# Patient Record
Sex: FEMALE | Race: WHITE | NOT HISPANIC OR LATINO | URBAN - METROPOLITAN AREA
[De-identification: names, ages, dates, MRNs, and addresses within clinical notes are randomized per-mention and may not be internally consistent; named-entity substitution may affect disease eponyms.]

---

## 2019-01-09 ENCOUNTER — TRANSFERRED RECORDS (OUTPATIENT)
Dept: HEALTH INFORMATION MANAGEMENT | Facility: CLINIC | Age: 59
End: 2019-01-09

## 2019-01-17 ENCOUNTER — TRANSFERRED RECORDS (OUTPATIENT)
Dept: HEALTH INFORMATION MANAGEMENT | Facility: CLINIC | Age: 59
End: 2019-01-17

## 2019-02-05 ENCOUNTER — TRANSFERRED RECORDS (OUTPATIENT)
Dept: HEALTH INFORMATION MANAGEMENT | Facility: CLINIC | Age: 59
End: 2019-02-05

## 2019-04-01 ENCOUNTER — TRANSFERRED RECORDS (OUTPATIENT)
Dept: HEALTH INFORMATION MANAGEMENT | Facility: CLINIC | Age: 59
End: 2019-04-01

## 2019-05-22 ENCOUNTER — TRANSFERRED RECORDS (OUTPATIENT)
Dept: HEALTH INFORMATION MANAGEMENT | Facility: CLINIC | Age: 59
End: 2019-05-22

## 2019-06-24 ENCOUNTER — TRANSFERRED RECORDS (OUTPATIENT)
Dept: HEALTH INFORMATION MANAGEMENT | Facility: CLINIC | Age: 59
End: 2019-06-24

## 2019-09-12 ENCOUNTER — TRANSFERRED RECORDS (OUTPATIENT)
Dept: HEALTH INFORMATION MANAGEMENT | Facility: CLINIC | Age: 59
End: 2019-09-12

## 2019-09-19 ENCOUNTER — TRANSFERRED RECORDS (OUTPATIENT)
Dept: HEALTH INFORMATION MANAGEMENT | Facility: CLINIC | Age: 59
End: 2019-09-19

## 2019-09-19 ENCOUNTER — MEDICAL CORRESPONDENCE (OUTPATIENT)
Dept: HEALTH INFORMATION MANAGEMENT | Facility: CLINIC | Age: 59
End: 2019-09-19

## 2019-09-24 NOTE — TELEPHONE ENCOUNTER
FUTURE VISIT INFORMATION      FUTURE VISIT INFORMATION:    Date: 11/25/2019    Time: 8:20 AM    Location: [facility]   REFERRAL INFORMATION:    Referring provider:  Dr. Noble Ferreira     Referring providers clinic:  [facility]    Reason for visit/diagnosis  Voice hoarseness, Esophageal Dysphagia    RECORDS REQUESTED FROM:       Clinic name Comments Records Status Imaging Status   [facility] 9/19/19 Office notes with Dr. Ferreira, Referral     9/26/19 EGD *received 10/4/19 Received- sent to scan 9/24/19    *9/26/19 EGD sent to scan 10/4/19    [facility] XR Video Swallow: 6/24/19  XR Esophagram: 6/24/19  US Soft Tissue Head or Neck: 4/1/19    Records received 10/31 - sent to scan  7/5/18 ENT notes with Kiara Ennis NP  8/1/19, 10/3/19 notes with Dr Agus Shoemaker   6/24/19 SLP notes with Janny Rose SLP   Received- sent to scan 10/4/19  PACS- archived 10/4/19                                9/24/19 Per scheduling staff, referra and recs to be faxed into clinic. Will follow up by the end of the week to see if they have been received. -Bhao    9/23/19 4:18PM received referral and office notes from [facility], Dr. Ferreira. Sent to scan on 9/24/19 12:30PM. Per office notes with Dr. Ferreira, it was noted that Pt saw an ENT specialist in [place].Called Pt and Pt noted that she was seen at [facility] as well. Pt noted that she has had an Upper GI Endoscopy with [facility] and is scheduled for another one with [facility] on 9/26/19. -Bhao    9/24/19 12:42PM sent fax request to [facility] ([phone]) for medical records -Bhao    9/27/19 1:52pm Sent 2nd fax request to [facility] for med recs. Sent request to [facility] ([phone]) for 9/26/19 EGD. -Bhao    10/4/19 9:41am Sent request to [facility] for images notes above; sent request to [facility] for EGD 9/26/19. -Bhao     10/4/19 11:02am Received EGD 9/26/19 report from [facility]; sent to scan for uploading. -ao     10/31/19 9:10AM received [facility] recs and sent to  neal. Sent a fax request or any speech records to Chris Thomason   *received and sent to neal Thomason     * 11/25/19 8:07 AM Called out to [facility] to get most recent office visit and study information - Milla

## 2019-09-26 ENCOUNTER — TRANSFERRED RECORDS (OUTPATIENT)
Dept: HEALTH INFORMATION MANAGEMENT | Facility: CLINIC | Age: 59
End: 2019-09-26

## 2019-10-03 ENCOUNTER — TRANSFERRED RECORDS (OUTPATIENT)
Dept: HEALTH INFORMATION MANAGEMENT | Facility: CLINIC | Age: 59
End: 2019-10-03

## 2019-10-03 ENCOUNTER — DOCUMENTATION ONLY (OUTPATIENT)
Dept: CARE COORDINATION | Facility: CLINIC | Age: 59
End: 2019-10-03

## 2019-10-16 ENCOUNTER — TRANSFERRED RECORDS (OUTPATIENT)
Dept: HEALTH INFORMATION MANAGEMENT | Facility: CLINIC | Age: 59
End: 2019-10-16

## 2019-10-30 ENCOUNTER — DOCUMENTATION ONLY (OUTPATIENT)
Dept: OTOLARYNGOLOGY | Facility: CLINIC | Age: 59
End: 2019-10-30

## 2019-10-30 NOTE — PROGRESS NOTES
Received a vm message from gianni at [facility], wanting clarification on records needed, as pts 2 yr history would produce about 454 pgs of records. Attempted to call gianni back and got voicemail, so this writer left a vm message advising them to send us any ENT related information, in specific anything related to the diagnosis patient is being referred here for with is hoarseness and dysphagia and any testing done relating to this. Call back number was left on vm in case there are any further questions.

## 2019-11-04 ENCOUNTER — TRANSFERRED RECORDS (OUTPATIENT)
Dept: HEALTH INFORMATION MANAGEMENT | Facility: CLINIC | Age: 59
End: 2019-11-04

## 2019-11-25 ENCOUNTER — OFFICE VISIT (OUTPATIENT)
Dept: OTOLARYNGOLOGY | Facility: CLINIC | Age: 59
End: 2019-11-25
Payer: COMMERCIAL

## 2019-11-25 ENCOUNTER — PRE VISIT (OUTPATIENT)
Dept: OTOLARYNGOLOGY | Facility: CLINIC | Age: 59
End: 2019-11-25

## 2019-11-25 VITALS — BODY MASS INDEX: 28.72 KG/M2 | WEIGHT: 183 LBS | HEIGHT: 67 IN

## 2019-11-25 DIAGNOSIS — R49.0 DYSPHONIA: Primary | ICD-10-CM

## 2019-11-25 DIAGNOSIS — R07.0 THROAT DISCOMFORT: ICD-10-CM

## 2019-11-25 DIAGNOSIS — R49.0 HOARSENESS: Primary | ICD-10-CM

## 2019-11-25 DIAGNOSIS — K21.9 GASTROESOPHAGEAL REFLUX DISEASE, ESOPHAGITIS PRESENCE NOT SPECIFIED: ICD-10-CM

## 2019-11-25 DIAGNOSIS — R13.19 OTHER DYSPHAGIA: ICD-10-CM

## 2019-11-25 DIAGNOSIS — R05.3 CHRONIC COUGH: ICD-10-CM

## 2019-11-25 DIAGNOSIS — J38.7 IRRITABLE LARYNX SYNDROME: ICD-10-CM

## 2019-11-25 DIAGNOSIS — R49.0 MUSCLE TENSION DYSPHONIA: ICD-10-CM

## 2019-11-25 DIAGNOSIS — R09.A2 GLOBUS SENSATION: ICD-10-CM

## 2019-11-25 PROBLEM — E10.9 TYPE 1 DIABETES MELLITUS (H): Status: ACTIVE | Noted: 2019-11-25

## 2019-11-25 PROBLEM — M19.049 OSTEOARTHROSIS, HAND: Status: ACTIVE | Noted: 2019-11-25

## 2019-11-25 PROBLEM — L72.3 SEBACEOUS CYST: Status: ACTIVE | Noted: 2019-11-25

## 2019-11-25 PROBLEM — E78.00 PURE HYPERCHOLESTEROLEMIA: Status: ACTIVE | Noted: 2019-11-25

## 2019-11-25 PROBLEM — R01.1 HEART MURMUR: Status: ACTIVE | Noted: 2019-11-25

## 2019-11-25 PROBLEM — E03.9 HYPOTHYROID: Status: ACTIVE | Noted: 2019-11-25

## 2019-11-25 PROBLEM — Z96.41 INSULIN PUMP STATUS: Status: ACTIVE | Noted: 2019-11-25

## 2019-11-25 PROBLEM — F43.21 ADJUSTMENT DISORDER WITH DEPRESSED MOOD: Status: ACTIVE | Noted: 2019-11-25

## 2019-11-25 PROBLEM — H04.129 TEAR FILM INSUFFICIENCY, UNSPECIFIED LATERALITY: Status: ACTIVE | Noted: 2019-11-25

## 2019-11-25 PROBLEM — M19.049 ARTHROPATHY OF HAND: Status: ACTIVE | Noted: 2019-11-25

## 2019-11-25 PROBLEM — I20.1 PRINZMETAL ANGINA (H): Status: ACTIVE | Noted: 2019-11-25

## 2019-11-25 PROBLEM — J45.909 ASTHMA: Status: ACTIVE | Noted: 2019-11-25

## 2019-11-25 PROBLEM — H25.9 SENILE CATARACT: Status: ACTIVE | Noted: 2019-11-25

## 2019-11-25 PROBLEM — H52.10 MYOPIA, UNSPECIFIED LATERALITY: Status: ACTIVE | Noted: 2019-11-25

## 2019-11-25 PROBLEM — E04.1 NONTOXIC UNINODULAR GOITER: Status: ACTIVE | Noted: 2019-11-25

## 2019-11-25 RX ORDER — MONTELUKAST SODIUM 10 MG/1
TABLET ORAL
Refills: 4 | COMMUNITY
Start: 2019-09-15

## 2019-11-25 RX ORDER — LEVOTHYROXINE SODIUM 25 UG/1
TABLET ORAL
Refills: 3 | COMMUNITY
Start: 2019-09-09

## 2019-11-25 RX ORDER — ALBUTEROL SULFATE 0.83 MG/ML
SOLUTION RESPIRATORY (INHALATION)
Refills: 1 | COMMUNITY
Start: 2019-05-13

## 2019-11-25 RX ORDER — ASPIRIN 81 MG/1
81 TABLET, CHEWABLE ORAL DAILY
COMMUNITY

## 2019-11-25 RX ORDER — IBUPROFEN 600 MG/1
TABLET ORAL
Refills: 1 | COMMUNITY
Start: 2019-06-24

## 2019-11-25 RX ORDER — SIMVASTATIN 20 MG
20 TABLET ORAL AT BEDTIME
COMMUNITY

## 2019-11-25 RX ORDER — FAMOTIDINE 10 MG
30 TABLET ORAL 2 TIMES DAILY
COMMUNITY

## 2019-11-25 RX ORDER — EPINEPHRINE 0.3 MG/.3ML
INJECTION SUBCUTANEOUS
Refills: 0 | COMMUNITY
Start: 2019-06-24

## 2019-11-25 RX ORDER — FEXOFENADINE HCL 180 MG/1
180 TABLET ORAL DAILY
COMMUNITY

## 2019-11-25 RX ORDER — SYRINGE WITH NEEDLE, 1 ML 28GX1/2"
SYRINGE, EMPTY DISPOSABLE MISCELLANEOUS
Refills: 11 | COMMUNITY
Start: 2019-10-22

## 2019-11-25 RX ORDER — DILTIAZEM HYDROCHLORIDE 180 MG/1
CAPSULE, COATED, EXTENDED RELEASE ORAL
Refills: 4 | COMMUNITY
Start: 2019-09-23

## 2019-11-25 RX ORDER — LANSOPRAZOLE 30 MG/1
CAPSULE, DELAYED RELEASE ORAL
Refills: 11 | COMMUNITY
Start: 2019-11-04

## 2019-11-25 RX ORDER — NITROGLYCERIN 0.4 MG/1
TABLET SUBLINGUAL
Refills: 0 | COMMUNITY
Start: 2019-07-26

## 2019-11-25 RX ORDER — ATORVASTATIN CALCIUM 20 MG/1
TABLET, FILM COATED ORAL
Refills: 4 | COMMUNITY
Start: 2019-09-10

## 2019-11-25 ASSESSMENT — MIFFLIN-ST. JEOR: SCORE: 1432.71

## 2019-11-25 ASSESSMENT — PAIN SCALES - GENERAL: PAINLEVEL: MILD PAIN (3)

## 2019-11-25 NOTE — LETTER
11/25/2019      RE: Lashae Rivas  [address]  [place] [address]       Dear Dr. Salas:    I had the pleasure of meeting Lashae Rivas in consultation at the [facility] of the [facility] at your request, for evaluation of multiple throat concerns. The note from our visit follows. Speech recognition software may have been used in the documentation below; input is reviewed before signature to the best of my ability. I appreciate the opportunity to participate in the care of this pleasant patient.    Please feel free to contact me with any questions.    Sincerely yours,    Sheila Mcarthur M.D., M.P.H.  , Laryngology  Director, [facility]  Otolaryngology- Head & Neck Surgery  [phone]          =====    History of Present Illness:   Lashae Rivas is a pleasant 59-year-old female with a past medical history including Type 1 diabetes mellitus, hiatal hernia, anterior cervical disc fusion who presents with a nearly one year history of throat concerns. Symptoms include total loss of voice, increased effort to talk/sing, throat irritation, dry throat, mucus in throat, frequent throat-clearing, frequent cough and poor voice quality.      Voice  She reports a voice problem that began in December of 2018.  In March, she lost voice for eight weeks in the setting of a bad reflux episode.  She is planning surgery in December for reflux.  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    She also reports her voice has decreased loudness, and is gravelly and raspy as well as having an inconsistent quality.  She also notes that sometimes her voice can be normal.  Her speaking vocal effort is more increased and singing vocal effort.  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    She has a history  of anterior cervical spine fusion in 2015, with no change in her voice at that time.  She also is noting a little shakiness in her voice.  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      Swallowing  She reports swallowing problems over the past year.  She also reports a lifelong history of difficulty with sore throats and tonsillitis.  She takes a regular diet with thin liquids.  She feels like there is a chronic sensation of globus.  She has early satiety with eating.     A gastric emptying test was unremarkable.  VFSS in June of 2019 was negative for penetration/aspiration.    She does experience increased swallowing effort with liquids and solids.      Cough/Throat-clearing  She reports cough and throat clearing that has been worse since March.  She associates the symptoms with on her reflux began to be particularly difficult.  Triggers include cold air, perfumes and strong smells, heartburn, lying down, and exercising.  With this begins with a sensation in her throat that is unpredictable.  Symptoms were reportedly not well correlated with findings per prior Gastroetnerology evaluation/VFSS.  She was treated with antibiotics/steroids, and did not notice a sgnificant change.      Breathing  No concerns.       Throat discomfort  She also reports a long history of throat discomfort which seems to be worse since March.  She also believes this may be worsened by her allergies.  She describes this as globus, throat pain, and a sensation of burning.      Reflux-type symptoms  She experiences heartburn/indigestion daily. She is taking reflux medications.  Most recent [facility] records not available for review. Patient reports that her Bravo showed a significant hiatal hernia and she is planning to have hernia surgery and Nissen in December 2019.    Prior outside records were reviewed for this visit. She previuosly saw ENT at [place] for evaluation of a coated tongue.  She also reports many allergies/food intolerances. She reports a remote history of bilateral Bell's palsy.      MEDICATIONS:     Current Outpatient Medications   Medication Sig Dispense Refill     albuterol (PROVENTIL) (2.5 MG/3ML) 0.083% neb solution NEBZULIZE 1 VIAL EVERY 6 HOURS AS NEEDED FOR SHORTNESS OF BREATH OR WHEEZING  1     aspirin (ASA) 81 MG chewable tablet Take 81 mg by mouth daily       atorvastatin (LIPITOR) 20 MG tablet TAKE 1 TABLET (20 MG TOTAL) BY MOUTH EVERY NIGHT AT BEDTIME  4     diltiazem ER COATED BEADS (CARDIZEM CD/CARTIA XT) 180 MG 24 hr capsule TAKE 1 CAPSULE (180 MG) BY MOUTH 1 TIME PER DAY  4     EPINEPHrine (EPIPEN/ADRENACLICK/OR ANY BX GENERIC EQUIV) 0.3 MG/0.3ML injection 2-pack INJECT 1 SYRINGE INTRAMUSCULARLY 1 TIME WHEN NEEDED FOR ALLERGIC REACTION  0     famotidine (PEPCID) 10 MG tablet Take 30 mg by mouth 2 times daily       fexofenadine (ALLEGRA) 180 MG tablet Take 180 mg by mouth daily       GLUCAGON EMERGENCY 1 MG kit ADMINISTER 1 MG INTRAVENOUSLY AS NEEDED FOR LOW BLOOD SUGAR  1     HUMALOG 100 UNIT/ML injection USE UP TO 70 UNITS DAILY IN INSULIN PUMP  2     LANsoprazole (PREVACID) 30 MG DR capsule TAKE 1 CAPSULE BY MOUTH TWICE A DAY BEFORE A MEAL  11     levothyroxine (SYNTHROID/LEVOTHROID) 25 MCG tablet TAKE 1 TABLET (25 MCG) BY MOUTH 1 TIME PER DAY OMIT ON THE 7TH DAY OF THE WEEK  3     montelukast (SINGULAIR) 10 MG tablet TAKE 1 TABLET (10 MG) BY MOUTH 1 TIME A DAY IN THE EVENING  4     nitroGLYcerin (NITROSTAT) 0.4 MG sublingual tablet DISSOLVE 1 TABLET UNDER THE TONGUE EVERY 5 MINUTES AS NEEDED FOR CHEST PAIN.  0     ranitidine (ZANTAC) 300 MG tablet TAKE 1 TABLET BY MOUTH EVERYDAY AT BEDTIME  11     UNABLE TO FIND MEDICATION NAME: Weekly allergy shots       UNABLE TO FIND 20 mg MEDICATION NAME: simvistatin, 20mg         ALLERGIES:    Allergies   Allergen Reactions     Other [Seasonal Allergies] Shortness Of Breath and Rash     Perfumes and other aromas, grasses, pine,  pollen, mite, mold, diesel, nickel, birch, oak     Penicillins Anaphylaxis     Wasps [Hornets] Anaphylaxis     Food Other (See Comments)     Reactions: vomiting, nausea    Cow's milk, cheddar cheese, cottage cheese, egg whites and yolk, malt, wheat, rye, gluten, barley, honey     Bactrim [Sulfamethoxazole W/Trimethoprim] Rash     Imdur [Isosorbide] Other (See Comments)     No more per Cardio & PharmD     Nickel Rash     No Clinical Screening - See Comments Other (See Comments)     Reactions: nausea, vomiting    Pineapple, grapefruit, pear, coconut, lashanda, clam, scallop, squash, brewers yeast, zucchini, garlic, mozzarella cheese, yogurt, corn, baker's yeast       PAST MEDICAL HISTORY:   Past Medical History:   Diagnosis Date     Adjustment disorder with depressed mood 11/25/2019     Arthropathy of hand 11/25/2019     Asthma 11/25/2019     Gastroesophageal reflux disease without esophagitis 11/25/2019     Hypothyroidism 11/25/2019     Insulin pump status 11/25/2019     Myopia 11/25/2019     Nontoxic uninodular goiter 11/25/2019     Osteoarthrosis, hand 11/25/2019     Prinzmetal angina (HCC) 11/25/2019     Pure hypercholesterolemia 11/25/2019     Sebaceous cyst 11/25/2019     Senile cataract 11/25/2019     Tear film insufficiency 11/25/2019     Type 1 diabetes mellitus (HCC) 11/25/2019     Undiagnosed cardiac murmers 11/25/2019        PAST SURGICAL HISTORY: No past surgical history on file. As per HPI    HABITS/SOCIAL HISTORY:    Social History     Tobacco Use     Smoking status: Never Smoker     Smokeless tobacco: Never Used   Substance Use Topics     Alcohol use: Not on file       FAMILY HISTORY:  No family history on file. Noncontributory.    REVIEW OF SYSTEMS:  The patient completed a comprehensive 11 point review of systems (below), which was reviewed. Positives are as noted below; pertinent findings are as noted in the HPI.     Patient Supplied Answers to Review of Systems  [facility] ENT ROS 11/25/2019   Constitutional  Appetite change, Unexplained fatigue   Neurology Headache   Ears, Nose, Throat Nasal congestion or drainage, Sore throat, Trouble swallowing, Hoarseness, Coughing up blood   Cardiopulmonary Cough   Gastrointestinal/Genitourinary Heartburn/indigestion, Unexplained nausea/vomiting   Musculoskeletal Sore or stiff joints   Allergy/Immunology Allergies or hay fever, Skin changes       PHYSICAL EXAMINATION:  General: The patient was alert and conversant, and in no acute distress.    Eyes: PERRL, conjunctiva and lids normal, sclera nonicteric.  Nose: Anterior rhinoscopy: no gross abnormalities. no  bleeding; no  mucopurulence; septum grossly normal, mild mucoid drainage and/or crusting.  Oral cavity/oropharynx: No masses or lesions. Dentition in good condition. Floor of mouth and oral tongue soft to palpation. Tongue mobility and palate elevation intact and symmetric.  Ears: Normal auricles, external auditory canals bilaterally. Visualized portions of tympanic membranes normal bilaterally.   Neck: No palpable cervical lymphadenopathy. There was moderate to severe  tenderness to palpation of the thyrohyoid space, which was narrow, as well as pre-cricoid region. No obvious thyroid abnormality. Landmarks palpable.  Resp: Breathing comfortably, no stridor or stertor.  Neuro: Symmetric facial function. Other cranial nerves as documented above.  Psych: Normal affect, pleasant and cooperative.  Voice/speech: Moderate to severe dysphonia characterized by near constant glottal coreas.  Extremities: No cyanosis, clubbing, or edema of the upper extremities.      Intake scores  Total Score for Last Patient-Answered VHI Questionnaire  No flowsheet data found.  Total Score for Last Patient-Answered EAT Questionnaire  No flowsheet data found.  Total Score for Last Patient-Answered CSI Questionnaire  No flowsheet data found.      PROCEDURE:   Flexible fiberoptic laryngoscopy and laryngovideostroboscopy  Indications: This procedure was  warranted to evaluate the patient's laryngeal anatomy and function. Risks, benefits, and alternatives were discussed.  Description: After written informed consent was obtained, a time-out was performed to confirm patient identity, procedure, and procedure site. Topical 3% lidocaine with 0.25% phenylephrine was applied to the nasal cavities. I performed the endoscopy and no complications were apparent. Continuous and stroboscopic light were utilized to assess routine phonation and variable frequency phonation.  Performed by: Sheila Mcarthur MD MPH  SLP: Jordan Lora MM, MA, CCC-SLP   Findings: Normal nasopharynx. Normal base of tongue, valleculae, and epiglottis. Vocal fold mobility: right: normal; left: normal. Medial edges of the vocal folds: smooth and straight. No focal mucosal lesions were observed on the true vocal folds. Glissade produced appropriate elongation. There was moderate supraglottic recruitment with connected speech. Mucosa of false vocal folds, aryepiglottic folds, piriform sinuses, and posterior glottis unremarkable. Airway was patent. Response to the therapy probes was fair. No focal lesions on NBI.    The addition of stroboscopy allowed evaluation of the mucosal wave.   Amplitude: right: normal; left: normal. Symmetry: intermittent symmetry. Closure pattern: complete. Closure plane: at glottic level. Phase distribution: normal. On inhalation phonation, mild prominence bilateral mid vocal folds, consistent with ongoing phonotraumatic impact of cough           IMAGING/RESULTS reviewed, with pertinent report excerpts below:  As above      IMPRESSION AND PLAN:   Lashae Rivas presents with muscle tension dysphonia and irritable larynx symptoms including dysphagia, globus, and chronic cough.  Her symptoms are likely exacerbated by difficulty with reflux, and this will soon be addressed with a hiatal hernia repair and Nissen in December with Dr. Trinh.    I recommended speech therapy as initial  primary management, with goals including reducing laryngeal irritability, improving laryngeal efficiency, decreasing vocal fold trauma, improving respiratory/phonatory coordination and improving phonatory mechanics.  Given her location, we recommended working with Shireen Jones CCC-SLP.    If she is able to start therapy prior to surgery, she may find it helpful for cough management post-operatively.  I recommended that she plan to do speech therapy regardless of the impact of her hernia surgery, as I think it is highly likely that the muscular patterns we observe today will persist even if the reflux is addressed, given the chronicity of the symptoms.    She will return if her symptoms persist despite therapy. I appreciate the opportunity to participate in the care of this patient.     =====  Addendum:  Upper endoscopy 9/26/19: normal esophagus, biopsy negative.  PH impedance 10/16/19: [place] 27.5  Manometry showed hiatal hernia, EG outflow obstruction.  July 2019: Four hour gastric emptying study - normal.        Sheila Mcarthur MD

## 2019-11-25 NOTE — NURSING NOTE
I provided patient hard copy of speech therapy referral.  I encouraged her to call to schedule appointment.    Shireen Jones, CCC-SLP  [address] [address]    [phone]  Fax [phone]    Order was faxed to [facility].

## 2019-11-25 NOTE — LETTER
"11/25/2019       RE: Lashae Rivas  [address]  [place] [address]     Dear Colleague,    Thank you for referring your patient, Lashae Rivas, to the [facility]. Please see a copy of my visit note below.    [facility]  Evaluation report    Clinician: Jordan Lora M.M., M.A., CCC/SLP  Seen in conjunction with: Dr. Mcarthur  Referring physician:  Dr. Salas  Patient: Lashae Rivas  Date of Visit: 11/25/2019    HISTORY  Chief complaint: Lashae Rivas is a 59 year old woman presenting today for evaluation of Voice, Cough, Throat discomfort, and Swallowing difficulties.    Onset: Began in December 2018  Inciting incident: unclear though she associates it with her reflux  Salient history: She has a history significant for DM1, Hypothyroidism, asthma, DDD.  For the past year she has been experiencing significant cough, voice changes, and globus sensation which she associates with gastroesophageal reflux or hiatal hernia.  Symptoms worsened significantly in March around the time that she was going through a variety of testing, though no concurrent illness or clear inciting incident found.  At that time she lost her voice seated for approximately 8 weeks since that time her voice has been intermittent good quality at points, but ongoing fatigue with use.  She has had an extensive work-up to date as listed below.  Her most recent gastroenterology work-up has led to pursuing a hiatal hernia repair with Nissen fundoplication in approximately 3 weeks time.  Given the hoarseness she had experienced it was recommended she follow-up with our clinic for further evaluation and treatment she presents for this today.    Work-up to date:    Indirect laryngoscopy-patient reports that she was told for pharyngeal structures appeared \"swollen\" these records were not available for review    Gastric Emptying - WNL    CXR - WNL    Thyroid US - \" thyroid gland appears " "diffusely heterogeneous and slightly bulky but no discrete nodules appreciated.  Similar findings noted on the 2/24/2017 scan.  Generalized heterogeneity may be slightly increased in the interim.  A component of underlying Hashimoto's thyroiditis would be a consideration.\"    Barton -DeMeester score of 27.5 and positive symptom correlation.    High-resolution manometry-within normal limits other than her hiatal hernia    CURRENT SYMPTOMS INCLUDE  VOICE    With regards to her voice that she had a period of total voice loss from March 2019 for 10 weeks    Poor voice quality    Total loss of voice    Increased effort use voice    She describes this as \"a very big problem\" over the past 12 months and bothers her \"quite a bit\"    She feels that her voice is improving over time    She has significant vocal demands including working, teaching, heavy phone use, and coaching    Voice can be normal at times    Voice breaks    Reduced projection    Change in speaking pitch    Describes voice is gravelly, raspy    Inconsistency in voicing  COUGH/THROAT CLEARING    Worse since March 2019    Frequent cough and throat clearing    Sensation of mucus in throat    She associates these symptoms with reflux    her cough/ throat clearing triggers include:  o Cold air  o Perfumes/strong smells  o Heartburn  o Lying down  o Exercising    VFSS confirmed cough response was not related to penetration or aspiration pre or post swallow  SWALLOWING    Persistent globus sensation    Sore throat which she associates with her tonsils    Improving over time    Maintains normal diet    VFSS performed 6/24/19 was within normal limits with no signs of penetration or aspiration    Early satiety    Per review of [facility] records patient reported 19 pounds of weight loss in 2019  ADDITIONAL    Throat discomfort    Throat pain    Globus sensation    Patient denies significant dyspnea.     OTHER PERTINENT HISTORY    History of an anterior approach C5-C6 fusion " "but with no voice changes following surgery.  This was completed in 2015.    No past medical history on file.  No past surgical history on file.    OBJECTIVE  PATIENT REPORTED MEASURES    Effort to talk: 6 / 10 (0-10 in which 10 represents maximal effort)    Effort to sing: 3 / 10 (0-10 in which 10 represents maximal effort)    Voice quality: 10 / 10 (0-10 in which 10 represents best possible voice)  Patient Supplied Answers To VHI Questionnaire  Voice Handicap Index (VHI-10) 11/25/2019   My voice makes it difficult for people to hear me 2   People have difficulty understanding me in a noisy room 3   My voice difficulties restrict my personal and social life.  3   I feel left out of conversations because of my voice 2   My voice problem causes me to lose income 4   I feel as though I have to strain to produce voice 2   The clarity of my voice is unpredictable 4   My voice problem upsets me 3   My voice makes me feel handicapped 2   People ask, \"What's wrong with your voice?\" 3   VHI-10 28     Patient Supplied Answers To CSI Questionnaire  Cough Severity Index (CSI) 11/25/2019   My cough is worse when I lie down 2   My coughing problem causes me to restrict my personal and social life 2   I tend to avoid places because of my cough problem 3   I feel embarrassed because of my coughing problem 3   People ask, ''What's wrong?'' because I cough a lot 3   I run out of air when I cough 1   My coughing problem affects my voice 2   My coughing problem limits my physical activity 2   My coughing problem upsets me 3   People ask me if I am sick because I cough a lot 4   CSI Score 25     Patient Supplied Answers To EAT Questionnaire  Eating Assessment Tool (EAT-10) 11/25/2019   My swallowing problem has caused me to lose weight 0   My swallowing problem interferes with my ability to go out for meals 2   Swallowing liquids takes extra effort 1   Swallowing solids takes extra effort 2   Swallowing pills takes extra effort 0 "   Swallowing is painful 1   The pleasure of eating is affected by my swallowing 1   When I swallow food sticks in my throat 1   I cough when I eat 1   Swallowing is stressful 1   EAT-10 10     PERCEPTUAL EVALUATION (CPT 93446)  POSTURE / TENSION:     neck and shoulders  BREATHING:     shallow    phonation is not coordinated with respiration  LARYNGEAL PALPATION:     reduced thyrohyoid space    no significant tenderness    palpation induced cough  VOICE:    Roughness: Moderate Consistent    Breathiness: Minimal    Strain: Moderate to severe Consistent    Loudness    Conversational speech:  Mildly reduced    Projected speech:  Mild to moderately reduced    Pitch:    Conversational speech:  WNL but perceived as lower secondary to roughness    Pitch glide:     Self-limited access to loft register    Even with clinician's report mild reduction of access to loft register is noted    Resonance:    Conversational speech:  laryngeal pharyngeal resonance    Singing vs. Speech:  Consistent across contexts initially; however, following stimulability testing she was able to access improved voice with singing vs. speech    CAPE-V Overall Severity:  64/100  COUGH/THROAT CLEARING:    Occasional    Increased in conjunction with voice use and palpation    Dry    Locus of cough/ throat clear: sounds consistent with upper airway     THERAPY PROBES: Improvement was elicited with use of clear speech protocol and coordination of respiration and phonation  ____________________________________________________________________  Laryngeal Function Studies   Laryngeal function studies are warranted but were unable to be completed secondary to technological issues.  ____________________________________________________________________  LARYNGEAL EXAMINATION  Procedure: Flexible endoscopy with chip-tip technology with stroboscopy, right nostril; topical anesthesia with 3% Lidocaine and 0.25% phenylephrine was applied.   Performed by: Dr. Mcarthur    The laryngeal and pharyngeal structures were evaluated for gross appearance, mobility, function, and focal lesions / abnormalities of the associated mucosa.  Stroboscopy was warranted to evaluate closure, symmetry, and vibratory characteristics of the vocal folds.  All findings were within normal limits with the exception of the following salient features:     Mild diffusely thickened secretions present throughout the supraglottis    Vocal folds appear essentially healthy without focal lesion or abnormality, though subtle non-contributory irregularity of the vibratory margins is seen on stroboscopy    Normal mobility and elongation    With phonatory tasks there is moderate medial lateral compression and mild anterior posterior compression    Improved glottic configuration, voice quality, and entrainment is noted with phonatory respiratory control though maximal clinician support required with tactile biofeedback    Stroboscopy demonstrates moderate entrainment associated with poor phonatory respiratory control.  With clinician cue and modeling patient was able to demonstrate improved entrainment and normal vibratory characteristics are seen at that time.  Subtle intermittent anterior glottal gap. Intermittent asymmetry      Supraglottic hyperfunction during running speech      Essentially healthy laryngeal and vocal fold mucosa    The laryngeal exam was reviewed with Ms. Rivas, and I provided pertinent explanations, as well as written and oral information.    ASSESSMENT / PLAN  IMPRESSIONS: Lashae Rivas is presenting today with R49.0 (Dysphonia), R05 (Chronic Cough) and F45.8 (Globus Sensation) in the context of an imbalance in function of the intrinsic and extrinsic muscles of the larynx and non-optimal phonatory respiratory coordination.  Laryngeal evaluation demonstrates essentially healthy laryngeal and vocal fold mucosa with significant patterns of supraglottic recruitment associated with poor phonatory  respiratory coordination.  Perceptually the patient's voice is dominated by laryngeal pharyngeal resonance and strain which is noted to reduce as trans-glottal airflow increases.  The patient's history of gastroesophageal reflux no doubt play significant role in guarding behaviors at the level of the larynx, and chronic cough, though patterns of laryngeal hypersensitivity in the wake of this irritation may served to propagate these patterns even following corrective surgery.    STIMULABILITY: results of therapy probes during perceptual and laryngeal evaluation demonstrate improvement with use of clear speech protocol and coordination of respiration and phonation    RECOMMENDATIONS:     A course of speech therapy is recommended to optimize vocal technique, improve voice quality, promote reduced discomfort, effort and fatigue and help reduce chronic cough and Globus sensation.    She demonstrates a Good prognosis for improvement given adherence to therapeutic recommendations.     Positive indicators: positive response to therapy probes diagnosis is known to respond to treatment    Negative indicators: none    Given the patient's proximity from clinic this therapy will not be completed at our location.  She was provided with the contact information for a colleague in [facility] significantly closer to her home who will be able to work with her on these aims.  Specific goals are left up to the discretion of the treating clinician as is dosage and frequency of visits.    Patient was provided with my contact information should I able to be of assistance in coordinating care.     Evaluation Only.    This treatment plan was developed with the patient who agreed with the recommendations.    TOTAL SERVICE TIME: 60 minutes  EVALUATION OF VOICE AND RESONANCE (80496)  NO CHARGE FACILITY FEE (54545)    Jordan Lora M.M., M.A., CCC-SLP  Speech-Language Pathologist  Certificate of Vocology  [phone]    *this  report was created in part through the use of computerized dictation software, and though reviewed following completion, some typographic errors may persist.  If there is confusion regarding any of this notes contents, please contact me for clarification.*

## 2019-11-25 NOTE — NURSING NOTE
"Chief Complaint   Patient presents with     Consult     Voice hoarseness, esophageal dysphagia     Height 1.702 m (5' 7\"), weight 83 kg (183 lb).    Zoya Roy, EMT  "

## 2019-11-25 NOTE — PATIENT INSTRUCTIONS
Thank you for choosing  [facility].  Please follow up with Dr. Mcarthur as needed or sooner if symptoms worsens or does not improve.     Dr. Mcarthur recommends speech therapy with Shireen Jones.    [phone] appointment scheduling option 1 and nurse advice option 3.

## 2019-11-25 NOTE — PROGRESS NOTES
Dear Dr. Salas:    I had the pleasure of meeting Lashae Rivas in consultation at the [facility] of the [facility] at your request, for evaluation of multiple throat concerns. The note from our visit follows. Speech recognition software may have been used in the documentation below; input is reviewed before signature to the best of my ability. I appreciate the opportunity to participate in the care of this pleasant patient.    Please feel free to contact me with any questions.    Sincerely yours,    Sheila Mcarthur M.D., M.P.H.  , Laryngology  Director, [facility]  Otolaryngology- Head & Neck Surgery  [phone]          =====    History of Present Illness:   Lashae Rivas is a pleasant 59-year-old female with a past medical history including Type 1 diabetes mellitus, hiatal hernia, anterior cervical disc fusion who presents with a nearly one year history of throat concerns. Symptoms include total loss of voice, increased effort to talk/sing, throat irritation, dry throat, mucus in throat, frequent throat-clearing, frequent cough and poor voice quality.      Voice  She reports a voice problem that began in December of 2018.  In March, she lost voice for eight weeks in the setting of a bad reflux episode.  She is planning surgery in December for reflux.  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    She also reports her voice has decreased loudness, and is gravelly and raspy as well as having an inconsistent quality.  She also notes that sometimes her voice can be normal.  Her speaking vocal effort is more increased and singing vocal effort.  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    She has a history of anterior cervical spine fusion in 2015, with no change in her voice at that  time.  She also is noting a little shakiness in her voice.  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      Swallowing  She reports swallowing problems over the past year.  She also reports a lifelong history of difficulty with sore throats and tonsillitis.  She takes a regular diet with thin liquids.  She feels like there is a chronic sensation of globus.  She has early satiety with eating.     A gastric emptying test was unremarkable.  VFSS in June of 2019 was negative for penetration/aspiration.    She does experience increased swallowing effort with liquids and solids.      Cough/Throat-clearing  She reports cough and throat clearing that has been worse since March.  She associates the symptoms with on her reflux began to be particularly difficult.  Triggers include cold air, perfumes and strong smells, heartburn, lying down, and exercising.  With this begins with a sensation in her throat that is unpredictable.  Symptoms were reportedly not well correlated with findings per prior Gastroetnerology evaluation/VFSS.  She was treated with antibiotics/steroids, and did not notice a sgnificant change.      Breathing  No concerns.       Throat discomfort  She also reports a long history of throat discomfort which seems to be worse since March.  She also believes this may be worsened by her allergies.  She describes this as globus, throat pain, and a sensation of burning.      Reflux-type symptoms  She experiences heartburn/indigestion daily. She is taking reflux medications.  Most recent [facility] records not available for review. Patient reports that her Bravo showed a significant hiatal hernia and she is planning to have hernia surgery and Nissen in December 2019.    Prior outside records were reviewed for this visit. She previuosly saw ENT at [place] for evaluation of a coated tongue. She also reports many allergies/food intolerances. She reports a remote  history of bilateral Bell's palsy.      MEDICATIONS:     Current Outpatient Medications   Medication Sig Dispense Refill     albuterol (PROVENTIL) (2.5 MG/3ML) 0.083% neb solution NEBZULIZE 1 VIAL EVERY 6 HOURS AS NEEDED FOR SHORTNESS OF BREATH OR WHEEZING  1     aspirin (ASA) 81 MG chewable tablet Take 81 mg by mouth daily       atorvastatin (LIPITOR) 20 MG tablet TAKE 1 TABLET (20 MG TOTAL) BY MOUTH EVERY NIGHT AT BEDTIME  4     diltiazem ER COATED BEADS (CARDIZEM CD/CARTIA XT) 180 MG 24 hr capsule TAKE 1 CAPSULE (180 MG) BY MOUTH 1 TIME PER DAY  4     EPINEPHrine (EPIPEN/ADRENACLICK/OR ANY BX GENERIC EQUIV) 0.3 MG/0.3ML injection 2-pack INJECT 1 SYRINGE INTRAMUSCULARLY 1 TIME WHEN NEEDED FOR ALLERGIC REACTION  0     famotidine (PEPCID) 10 MG tablet Take 30 mg by mouth 2 times daily       fexofenadine (ALLEGRA) 180 MG tablet Take 180 mg by mouth daily       GLUCAGON EMERGENCY 1 MG kit ADMINISTER 1 MG INTRAVENOUSLY AS NEEDED FOR LOW BLOOD SUGAR  1     HUMALOG 100 UNIT/ML injection USE UP TO 70 UNITS DAILY IN INSULIN PUMP  2     LANsoprazole (PREVACID) 30 MG DR capsule TAKE 1 CAPSULE BY MOUTH TWICE A DAY BEFORE A MEAL  11     levothyroxine (SYNTHROID/LEVOTHROID) 25 MCG tablet TAKE 1 TABLET (25 MCG) BY MOUTH 1 TIME PER DAY OMIT ON THE 7TH DAY OF THE WEEK  3     montelukast (SINGULAIR) 10 MG tablet TAKE 1 TABLET (10 MG) BY MOUTH 1 TIME A DAY IN THE EVENING  4     nitroGLYcerin (NITROSTAT) 0.4 MG sublingual tablet DISSOLVE 1 TABLET UNDER THE TONGUE EVERY 5 MINUTES AS NEEDED FOR CHEST PAIN.  0     ranitidine (ZANTAC) 300 MG tablet TAKE 1 TABLET BY MOUTH EVERYDAY AT BEDTIME  11     UNABLE TO FIND MEDICATION NAME: Weekly allergy shots       UNABLE TO FIND 20 mg MEDICATION NAME: simvistatin, 20mg         ALLERGIES:    Allergies   Allergen Reactions     Other [Seasonal Allergies] Shortness Of Breath and Rash     Perfumes and other aromas, grasses, pine, pollen, mite, mold, diesel, nickel, birch, oak     Penicillins  Anaphylaxis     Wasps [Hornets] Anaphylaxis     Food Other (See Comments)     Reactions: vomiting, nausea    Cow's milk, cheddar cheese, cottage cheese, egg whites and yolk, malt, wheat, rye, gluten, barley, honey     Bactrim [Sulfamethoxazole W/Trimethoprim] Rash     Imdur [Isosorbide] Other (See Comments)     No more per Cardio & PharmD     Nickel Rash     No Clinical Screening - See Comments Other (See Comments)     Reactions: nausea, vomiting    Pineapple, grapefruit, pear, coconut, lashanda, clam, scallop, squash, brewers yeast, zucchini, garlic, mozzarella cheese, yogurt, corn, baker's yeast       PAST MEDICAL HISTORY:   Past Medical History:   Diagnosis Date     Adjustment disorder with depressed mood 11/25/2019     Arthropathy of hand 11/25/2019     Asthma 11/25/2019     Gastroesophageal reflux disease without esophagitis 11/25/2019     Hypothyroidism 11/25/2019     Insulin pump status 11/25/2019     Myopia 11/25/2019     Nontoxic uninodular goiter 11/25/2019     Osteoarthrosis, hand 11/25/2019     Prinzmetal angina (HCC) 11/25/2019     Pure hypercholesterolemia 11/25/2019     Sebaceous cyst 11/25/2019     Senile cataract 11/25/2019     Tear film insufficiency 11/25/2019     Type 1 diabetes mellitus (HCC) 11/25/2019     Undiagnosed cardiac murmers 11/25/2019        PAST SURGICAL HISTORY: No past surgical history on file. As per HPI    HABITS/SOCIAL HISTORY:    Social History     Tobacco Use     Smoking status: Never Smoker     Smokeless tobacco: Never Used   Substance Use Topics     Alcohol use: Not on file       FAMILY HISTORY:  No family history on file. Noncontributory.    REVIEW OF SYSTEMS:  The patient completed a comprehensive 11 point review of systems (below), which was reviewed. Positives are as noted below; pertinent findings are as noted in the HPI.     Patient Supplied Answers to Review of Systems  [facility] ENT ROS 11/25/2019   Constitutional Appetite change, Unexplained fatigue   Neurology Headache    Ears, Nose, Throat Nasal congestion or drainage, Sore throat, Trouble swallowing, Hoarseness, Coughing up blood   Cardiopulmonary Cough   Gastrointestinal/Genitourinary Heartburn/indigestion, Unexplained nausea/vomiting   Musculoskeletal Sore or stiff joints   Allergy/Immunology Allergies or hay fever, Skin changes       PHYSICAL EXAMINATION:  General: The patient was alert and conversant, and in no acute distress.    Eyes: PERRL, conjunctiva and lids normal, sclera nonicteric.  Nose: Anterior rhinoscopy: no gross abnormalities. no  bleeding; no  mucopurulence; septum grossly normal, mild mucoid drainage and/or crusting.  Oral cavity/oropharynx: No masses or lesions. Dentition in good condition. Floor of mouth and oral tongue soft to palpation. Tongue mobility and palate elevation intact and symmetric.  Ears: Normal auricles, external auditory canals bilaterally. Visualized portions of tympanic membranes normal bilaterally.   Neck: No palpable cervical lymphadenopathy. There was moderate to severe  tenderness to palpation of the thyrohyoid space, which was narrow, as well as pre-cricoid region. No obvious thyroid abnormality. Landmarks palpable.  Resp: Breathing comfortably, no stridor or stertor.  Neuro: Symmetric facial function. Other cranial nerves as documented above.  Psych: Normal affect, pleasant and cooperative.  Voice/speech: Moderate to severe dysphonia characterized by near constant glottal coreas.  Extremities: No cyanosis, clubbing, or edema of the upper extremities.      Intake scores  Total Score for Last Patient-Answered VHI Questionnaire  No flowsheet data found.  Total Score for Last Patient-Answered EAT Questionnaire  No flowsheet data found.  Total Score for Last Patient-Answered CSI Questionnaire  No flowsheet data found.      PROCEDURE:   Flexible fiberoptic laryngoscopy and laryngovideostroboscopy  Indications: This procedure was warranted to evaluate the patient's laryngeal anatomy and  function. Risks, benefits, and alternatives were discussed.  Description: After written informed consent was obtained, a time-out was performed to confirm patient identity, procedure, and procedure site. Topical 3% lidocaine with 0.25% phenylephrine was applied to the nasal cavities. I performed the endoscopy and no complications were apparent. Continuous and stroboscopic light were utilized to assess routine phonation and variable frequency phonation.  Performed by: Sheila Mcarthur MD MPH  SLP: Jordan Lora MM, MA, CCC-SLP   Findings: Normal nasopharynx. Normal base of tongue, valleculae, and epiglottis. Vocal fold mobility: right: normal; left: normal. Medial edges of the vocal folds: smooth and straight. No focal mucosal lesions were observed on the true vocal folds. Glissade produced appropriate elongation. There was moderate supraglottic recruitment with connected speech. Mucosa of false vocal folds, aryepiglottic folds, piriform sinuses, and posterior glottis unremarkable. Airway was patent. Response to the therapy probes was fair. No focal lesions on NBI.    The addition of stroboscopy allowed evaluation of the mucosal wave.   Amplitude: right: normal; left: normal. Symmetry: intermittent symmetry. Closure pattern: complete. Closure plane: at glottic level. Phase distribution: normal. On inhalation phonation, mild prominence bilateral mid vocal folds, consistent with ongoing phonotraumatic impact of cough           IMAGING/RESULTS reviewed, with pertinent report excerpts below:  As above      IMPRESSION AND PLAN:   Lashae Rivas presents with muscle tension dysphonia and irritable larynx symptoms including dysphagia, globus, and chronic cough.  Her symptoms are likely exacerbated by difficulty with reflux, and this will soon be addressed with a hiatal hernia repair and Nissen in December with Dr. Trinh.    I recommended speech therapy as initial primary management, with goals including reducing  laryngeal irritability, improving laryngeal efficiency, decreasing vocal fold trauma, improving respiratory/phonatory coordination and improving phonatory mechanics.  Given her location, we recommended working with Shireen Jones CCC-SLP.    If she is able to start therapy prior to surgery, she may find it helpful for cough management post-operatively.  I recommended that she plan to do speech therapy regardless of the impact of her hernia surgery, as I think it is highly likely that the muscular patterns we observe today will persist even if the reflux is addressed, given the chronicity of the symptoms.    She will return if her symptoms persist despite therapy. I appreciate the opportunity to participate in the care of this patient.     =====  Addendum:  Upper endoscopy 9/26/19: normal esophagus, biopsy negative.  PH impedance 10/16/19: [place] 27.5  Manometry showed hiatal hernia, EG outflow obstruction.  July 2019: Four hour gastric emptying study - normal.

## 2019-11-25 NOTE — PROGRESS NOTES
"[facility] VOICE CLINIC  Evaluation report    Clinician: Jordan Lora M.M., M.A., CCC/SLP  Seen in conjunction with: Dr. Mcarthur  Referring physician:  Dr. Salas  Patient: Lashae Rivas  Date of Visit: 11/25/2019    HISTORY  Chief complaint: Lashea Rivas is a 59 year old woman presenting today for evaluation of Voice, Cough, Throat discomfort, and Swallowing difficulties.    Onset: Began in December 2018  Inciting incident: unclear though she associates it with her reflux  Salient history: She has a history significant for DM1, Hypothyroidism, asthma, DDD.  For the past year she has been experiencing significant cough, voice changes, and globus sensation which she associates with gastroesophageal reflux or hiatal hernia.  Symptoms worsened significantly in March around the time that she was going through a variety of testing, though no concurrent illness or clear inciting incident found.  At that time she lost her voice seated for approximately 8 weeks since that time her voice has been intermittent good quality at points, but ongoing fatigue with use.  She has had an extensive work-up to date as listed below.  Her most recent gastroenterology work-up has led to pursuing a hiatal hernia repair with Nissen fundoplication in approximately 3 weeks time.  Given the hoarseness she had experienced it was recommended she follow-up with our clinic for further evaluation and treatment she presents for this today.    Work-up to date:    Indirect laryngoscopy-patient reports that she was told for pharyngeal structures appeared \"swollen\" these records were not available for review    Gastric Emptying - WNL    CXR - WNL    Thyroid US - \" thyroid gland appears diffusely heterogeneous and slightly bulky but no discrete nodules appreciated.  Similar findings noted on the 2/24/2017 scan.  Generalized heterogeneity may be slightly increased in the interim.  A component of underlying Hashimoto's thyroiditis would be a " "consideration.\"    Barton -DeMeester score of 27.5 and positive symptom correlation.    High-resolution manometry-within normal limits other than her hiatal hernia      CURRENT SYMPTOMS INCLUDE  VOICE    With regards to her voice that she had a period of total voice loss from March 2019 for 10 weeks    Poor voice quality    Total loss of voice    Increased effort use voice    She describes this as \"a very big problem\" over the past 12 months and bothers her \"quite a bit\"    She feels that her voice is improving over time    She has significant vocal demands including working, teaching, heavy phone use, and coaching    Voice can be normal at times    Voice breaks    Reduced projection    Change in speaking pitch    Describes voice is gravelly, raspy    Inconsistency in voicing    COUGH/THROAT CLEARING    Worse since March 2019    Frequent cough and throat clearing    Sensation of mucus in throat    She associates these symptoms with reflux    her cough/ throat clearing triggers include:  o Cold air  o Perfumes/strong smells  o Heartburn  o Lying down  o Exercising    VFSS confirmed cough response was not related to penetration or aspiration pre or post swallow    SWALLOWING    Persistent globus sensation    Sore throat which she associates with her tonsils    Improving over time    Maintains normal diet    VFSS performed 6/24/19 was within normal limits with no signs of penetration or aspiration    Early satiety    Per review of [facility] records patient reported 19 pounds of weight loss in 2019    ADDITIONAL    Throat discomfort    Throat pain    Globus sensation    Patient denies significant dyspnea.     OTHER PERTINENT HISTORY    History of an anterior approach C5-C6 fusion but with no voice changes following surgery.  This was completed in 2015.    No past medical history on file.  No past surgical history on file.    OBJECTIVE  PATIENT REPORTED MEASURES    Effort to talk: 6 / 10 (0-10 in which 10 represents maximal " "effort)    Effort to sing: 3 / 10 (0-10 in which 10 represents maximal effort)    Voice quality: 10 / 10 (0-10 in which 10 represents best possible voice)  Patient Supplied Answers To VHI Questionnaire  Voice Handicap Index (VHI-10) 11/25/2019   My voice makes it difficult for people to hear me 2   People have difficulty understanding me in a noisy room 3   My voice difficulties restrict my personal and social life.  3   I feel left out of conversations because of my voice 2   My voice problem causes me to lose income 4   I feel as though I have to strain to produce voice 2   The clarity of my voice is unpredictable 4   My voice problem upsets me 3   My voice makes me feel handicapped 2   People ask, \"What's wrong with your voice?\" 3   VHI-10 28     Patient Supplied Answers To CSI Questionnaire  Cough Severity Index (CSI) 11/25/2019   My cough is worse when I lie down 2   My coughing problem causes me to restrict my personal and social life 2   I tend to avoid places because of my cough problem 3   I feel embarrassed because of my coughing problem 3   People ask, ''What's wrong?'' because I cough a lot 3   I run out of air when I cough 1   My coughing problem affects my voice 2   My coughing problem limits my physical activity 2   My coughing problem upsets me 3   People ask me if I am sick because I cough a lot 4   CSI Score 25     Patient Supplied Answers To EAT Questionnaire  Eating Assessment Tool (EAT-10) 11/25/2019   My swallowing problem has caused me to lose weight 0   My swallowing problem interferes with my ability to go out for meals 2   Swallowing liquids takes extra effort 1   Swallowing solids takes extra effort 2   Swallowing pills takes extra effort 0   Swallowing is painful 1   The pleasure of eating is affected by my swallowing 1   When I swallow food sticks in my throat 1   I cough when I eat 1   Swallowing is stressful 1   EAT-10 10     PERCEPTUAL EVALUATION (CPT 49253)  POSTURE / TENSION: "     neck and shoulders    BREATHING:     shallow    phonation is not coordinated with respiration    LARYNGEAL PALPATION:     reduced thyrohyoid space    no significant tenderness    palpation induced cough    VOICE:    Roughness: Moderate Consistent    Breathiness: Minimal    Strain: Moderate to severe Consistent    Loudness    Conversational speech:  Mildly reduced    Projected speech:  Mild to moderately reduced    Pitch:    Conversational speech:  WNL but perceived as lower secondary to roughness    Pitch glide:     Self-limited access to loft register    Even with clinician's report mild reduction of access to loft register is noted    Resonance:    Conversational speech:  laryngeal pharyngeal resonance    Singing vs. Speech:  Consistent across contexts initially; however, following stimulability testing she was able to access improved voice with singing vs. speech    CAPE-V Overall Severity:  64/100    COUGH/THROAT CLEARING:    Occasional    Increased in conjunction with voice use and palpation    Dry    Locus of cough/ throat clear: sounds consistent with upper airway     THERAPY PROBES: Improvement was elicited with use of clear speech protocol and coordination of respiration and phonation    ____________________________________________________________________  Laryngeal Function Studies   Laryngeal function studies are warranted but were unable to be completed secondary to technological issues.  ____________________________________________________________________    LARYNGEAL EXAMINATION  Procedure: Flexible endoscopy with chip-tip technology with stroboscopy, right nostril; topical anesthesia with 3% Lidocaine and 0.25% phenylephrine was applied.   Performed by: Dr. Mcarthur   The laryngeal and pharyngeal structures were evaluated for gross appearance, mobility, function, and focal lesions / abnormalities of the associated mucosa.  Stroboscopy was warranted to evaluate closure, symmetry, and vibratory  characteristics of the vocal folds.  All findings were within normal limits with the exception of the following salient features:     Mild diffusely thickened secretions present throughout the supraglottis    Vocal folds appear essentially healthy without focal lesion or abnormality, though subtle non-contributory irregularity of the vibratory margins is seen on stroboscopy    Normal mobility and elongation    With phonatory tasks there is moderate medial lateral compression and mild anterior posterior compression    Improved glottic configuration, voice quality, and entrainment is noted with phonatory respiratory control though maximal clinician support required with tactile biofeedback    Stroboscopy demonstrates moderate entrainment associated with poor phonatory respiratory control.  With clinician cue and modeling patient was able to demonstrate improved entrainment and normal vibratory characteristics are seen at that time.  Subtle intermittent anterior glottal gap. Intermittent asymmetry      Supraglottic hyperfunction during running speech      Essentially healthy laryngeal and vocal fold mucosa    The laryngeal exam was reviewed with Ms. Rivas, and I provided pertinent explanations, as well as written and oral information.    ASSESSMENT / PLAN  IMPRESSIONS: Lashae Rivas is presenting today with R49.0 (Dysphonia), R05 (Chronic Cough) and F45.8 (Globus Sensation) in the context of an imbalance in function of the intrinsic and extrinsic muscles of the larynx and non-optimal phonatory respiratory coordination.  Laryngeal evaluation demonstrates essentially healthy laryngeal and vocal fold mucosa with significant patterns of supraglottic recruitment associated with poor phonatory respiratory coordination.  Perceptually the patient's voice is dominated by laryngeal pharyngeal resonance and strain which is noted to reduce as trans-glottal airflow increases.  The patient's history of gastroesophageal reflux no  doubt play significant role in guarding behaviors at the level of the larynx, and chronic cough, though patterns of laryngeal hypersensitivity in the wake of this irritation may served to propagate these patterns even following corrective surgery.    STIMULABILITY: results of therapy probes during perceptual and laryngeal evaluation demonstrate improvement with use of clear speech protocol and coordination of respiration and phonation    RECOMMENDATIONS:     A course of speech therapy is recommended to optimize vocal technique, improve voice quality, promote reduced discomfort, effort and fatigue and help reduce chronic cough and Globus sensation.    She demonstrates a Good prognosis for improvement given adherence to therapeutic recommendations.     Positive indicators: positive response to therapy probes diagnosis is known to respond to treatment    Negative indicators: none    Given the patient's proximity from clinic this therapy will not be completed at our location.  She was provided with the contact information for a colleague in [facility] significantly closer to her home who will be able to work with her on these aims.  Specific goals are left up to the discretion of the treating clinician as is dosage and frequency of visits.    Patient was provided with my contact information should I able to be of assistance in coordinating care.     Evaluation Only.    This treatment plan was developed with the patient who agreed with the recommendations.    TOTAL SERVICE TIME: 60 minutes  EVALUATION OF VOICE AND RESONANCE (66172)  NO CHARGE FACILITY FEE (26994)    Jordan Lora M.M., M.A., CCC-SLP  Speech-Language Pathologist  Certificate of Vocology  [phone]    *this report was created in part through the use of computerized dictation software, and though reviewed following completion, some typographic errors may persist.  If there is confusion regarding any of this notes contents, please  contact me for clarification.*

## 2020-03-11 ENCOUNTER — HEALTH MAINTENANCE LETTER (OUTPATIENT)
Age: 60
End: 2020-03-11

## 2021-01-03 ENCOUNTER — HEALTH MAINTENANCE LETTER (OUTPATIENT)
Age: 61
End: 2021-01-03

## 2021-04-25 ENCOUNTER — HEALTH MAINTENANCE LETTER (OUTPATIENT)
Age: 61
End: 2021-04-25

## 2021-05-27 ENCOUNTER — RECORDS - HEALTHEAST (OUTPATIENT)
Dept: ADMINISTRATIVE | Facility: CLINIC | Age: 61
End: 2021-05-27

## 2021-05-29 ENCOUNTER — RECORDS - HEALTHEAST (OUTPATIENT)
Dept: ADMINISTRATIVE | Facility: CLINIC | Age: 61
End: 2021-05-29

## 2021-08-15 ENCOUNTER — HEALTH MAINTENANCE LETTER (OUTPATIENT)
Age: 61
End: 2021-08-15

## 2021-10-10 ENCOUNTER — HEALTH MAINTENANCE LETTER (OUTPATIENT)
Age: 61
End: 2021-10-10

## 2021-12-05 ENCOUNTER — HEALTH MAINTENANCE LETTER (OUTPATIENT)
Age: 61
End: 2021-12-05

## 2022-03-14 ENCOUNTER — OFFICE VISIT (OUTPATIENT)
Dept: OTOLARYNGOLOGY | Facility: CLINIC | Age: 62
End: 2022-03-14
Payer: COMMERCIAL

## 2022-03-14 VITALS — HEIGHT: 66 IN | BODY MASS INDEX: 31.34 KG/M2 | HEART RATE: 74 BPM | OXYGEN SATURATION: 97 % | WEIGHT: 195 LBS

## 2022-03-14 DIAGNOSIS — R49.0 DYSPHONIA: Primary | ICD-10-CM

## 2022-03-14 DIAGNOSIS — R05.3 CHRONIC COUGH: ICD-10-CM

## 2022-03-14 DIAGNOSIS — K21.9 GASTROESOPHAGEAL REFLUX DISEASE WITHOUT ESOPHAGITIS: ICD-10-CM

## 2022-03-14 DIAGNOSIS — H91.90 HEARING LOSS, UNSPECIFIED HEARING LOSS TYPE, UNSPECIFIED LATERALITY: ICD-10-CM

## 2022-03-14 PROCEDURE — 99214 OFFICE O/P EST MOD 30 MIN: CPT | Mod: 25 | Performed by: OTOLARYNGOLOGY

## 2022-03-14 PROCEDURE — 92524 BEHAVRAL QUALIT ANALYS VOICE: CPT | Mod: GN | Performed by: SPEECH-LANGUAGE PATHOLOGIST

## 2022-03-14 PROCEDURE — 31579 LARYNGOSCOPY TELESCOPIC: CPT | Performed by: OTOLARYNGOLOGY

## 2022-03-14 RX ORDER — SENNOSIDES 8.6 MG
CAPSULE ORAL
COMMUNITY
Start: 2022-02-15

## 2022-03-14 RX ORDER — PANTOPRAZOLE SODIUM 40 MG/1
40 TABLET, DELAYED RELEASE ORAL
COMMUNITY
Start: 2022-01-31

## 2022-03-14 RX ORDER — LOSARTAN POTASSIUM 25 MG/1
TABLET ORAL
COMMUNITY
Start: 2022-01-31

## 2022-03-14 ASSESSMENT — PAIN SCALES - GENERAL: PAINLEVEL: MILD PAIN (3)

## 2022-03-14 NOTE — LETTER
"3/14/2022       RE: Lashae Rivas  [address]  [place] [address]     Dear Colleague,    Thank you for referring your patient, Lashae Rivas, to the [facility] VOICE CLINIC [place] [facility]. Please see a copy of my visit note below.    [facility]  CLINICAL EVALUATION REPORT    Patient: Lashae Rivas  Date of Service: 3/14/2022  Seen in conjunction with: Dr. Sheila Mcarthur  Referring physician: Dr. Mcarthur    HISTORY  PATIENT INFORMATION  Rissa Rivas is a 62 year old female presenting today for repeat evaluation of voice and resonance. Salient details of her symptom history are as follows:    This patient was previously seen in our clinic on 11/25/2019.  Per Dr. 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  Her symptoms are likely exacerbated by difficulty with reflux, and this will soon be addressed with a hiatal hernia repair and Nissen in December with Dr. Trinh. I recommended speech therapy as initial primary management, with goals including reducing laryngeal irritability, improving laryngeal efficiency, decreasing vocal fold trauma, improving respiratory/phonatory coordination and improving phonatory mechanics.  Given her location, we recommended working with Shireen Jones CCC-SLP. If she is able to start therapy prior to surgery, she may find it helpful for cough management post-operatively.  I recommended that she plan to do speech therapy regardless of the impact of her hernia surgery, as I think it is highly likely that the muscular patterns we observe today will persist even if the reflux is addressed, given the chronicity of the symptoms.\"    Interval history  The patient underwent a course of therapy, which she feels helped her with improving her voice quality and with reducing frequency of " "coughing and throat clearing, although the symptoms were primarily related to reflux symptoms.  When the patient underwent Nissen fundoplication, her symptoms essentially resolved.  Unfortunately, she experienced significant coughing with COVID-19 in December 2020, and subsequently had failure of Nissen and return of coughing symptoms in tandem with return of reflux symptoms.  Over the course of the past year, she has had intermittent periods of almost total voice loss for up to 8 weeks at a time following periods of intense coughing in the context of reflux symptoms.  She is working to manage her GI symptoms with [email or web address].  The patient denies breathing difficulties, and denies swallowing difficulties.  Currently, her coughing and throat clearing are still somewhat of a problem, but continue to be markedly improved since she was initially seen by our team in 2019.  Regarding her voice, she feels that it is currently almost normal as it has been a long time since her last severe coughing fit.     OBJECTIVE FINDINGS  PATIENT REPORTED MEASURES  Patient Supplied Answers To [facility] Voice Questionnaire  No flowsheet data found.     Patient Supplied Answers To VHI Questionnaire  Voice Handicap Index (VHI-10) 3/14/2022   My voice makes it difficult for people to hear me 2   People have difficulty understanding me in a noisy room 1   My voice difficulties restrict my personal and social life.  2   I feel left out of conversations because of my voice 0   My voice problem causes me to lose income 4   I feel as though I have to strain to produce voice 2   The clarity of my voice is unpredictable 3   My voice problem upsets me 2   My voice makes me feel handicapped 2   People ask, \"What's wrong with your voice?\" 2   VHI-10 20        Patient Supplied Answers To CSI Questionnaire  Cough Severity Index (CSI) 3/14/2022   My cough is worse when I lie down -   My coughing problem causes me to restrict my personal and social " life 1   I tend to avoid places because of my cough problem 1   I feel embarrassed because of my coughing problem 1   People ask, ''What's wrong?'' because I cough a lot 0   I run out of air when I cough 1   My coughing problem affects my voice 2   My coughing problem limits my physical activity 1   My coughing problem upsets me 2   People ask me if I am sick because I cough a lot 2   CSI Score 11        Patient Supplied Answers To EAT Questionnaire  Eating Assessment Tool (EAT-10) 3/14/2022   My swallowing problem has caused me to lose weight 0   My swallowing problem interferes with my ability to go out for meals 0   Swallowing liquids takes extra effort 1   Swallowing solids takes extra effort 1   Swallowing pills takes extra effort 1   Swallowing is painful 0   The pleasure of eating is affected by my swallowing 0   When I swallow food sticks in my throat 0   I cough when I eat 1   Swallowing is stressful 1   EAT-10 5           Self-Ratings as discussed with patient:  No flowsheet data found.     PERCEPTUAL EVALUATION (31107)  VOICE/ SPEECH/ NON-COMMUNICATIVE LARYNGEAL BEHAVIORS EVALUATION    Palpation of the laryngeal area shows:    firm musculature    tenderness of the thyrohyoid area    Breathing pattern:     inspirations are inadequate in volume and frequency    Cough/ Throat clear:    not observed today     Rissa states today is a typical voice day, with clinician observing voice quality characterized by:    Roughness: WNL    Breathiness: WNL    Strain: Mild    Habitual pitch is perceptually within normal limits and appropriate for age and gender    Loudness is WNL and is appropriate for the setting    GRADE, ROUGHNESS, BREATHINESS, ASTHENIA, STRAIN  (GRBAS) scale:  G(0)R(0)B(0)A(0)S(0)    LARYNGEAL EXAMINATION  Procedure: Flexible endoscopy with chip-tip technology with stroboscopy, left nostril; topical anesthesia with 3% Lidocaine and 0.25% phenylephrine was applied.   Performed by: Dr. Sosa  Blue  Verbal consent was obtained and witnessed prior to this procedure.   A time-out was performed, verifying patient, procedure, and site.   This exam shows:    Velar Function: Not assessed    Secretions: Within normal limits    Laryngeal Mucosa: essentially healthy laryngeal mucosa    Vocal fold mucosa:    o RTVF - within normal limits, no visible lesions  o LTVF - within normal limits, no visible lesions    Vocal fold function:   o Vocal folds are mobile and meet at midline  o Movement is brisk and symmetric  o Exam is neurologically normal     Airway is adequate    Elongation of the vocal folds for pitch increase is normal    Moderate to severe four-way constriction of the supraglottic larynx during connected speech    Therapy probes show marked reduction in supraglottic constriction when provided with cues to increase airflow and during singing    The addition of stroboscopy provided the following information:  o Vibratory Behavior: Present bilaterally  o Amplitude Right: WNL  o Amplitude Left: WNL  o Mucosal Wave Right: WNL  o Mucosal Wave Left: WNL  o Glottic closure:  on phonation glottic closure is normal   o Symmetry: Mostly symmetric  o Periodicity: Mostly periodic     STIMULABILITY: results of therapy probes during perceptual and laryngeal evaluation demonstrate improvement with coordination of respiration and phonation    ASSESSMENT / PLAN  IMPRESSIONS: Rissa Rivas is a 62 year old female with a history of Chronic Cough (R05.3) and Dysphonia (R49.0) characterized by strain.  Laryngeal exam demonstrates healthy laryngeal structure and normal laryngeal function with no evidence of chronic exposure to stomach acid.  Presence of laryngeal endoscope resulted in substantial protection/guarding behaviors of the larynx, which resulted in a dramatic but brief worsening of voice quality, which resolved with cues to increase airflow during voicing, and following completion of today's exam.  Because the patient  has very clear nonbehavioral triggers for her coughing (untreated reflux), and because she appears to have maximal control of cough suppression strategies, therapy for cough suppression is not currently recommended.  Regarding voice, however, she appears to adopt a pattern of substantial laryngeal hyperfunction in response to laryngeal irritation, which could occur with coughing with or without reflux.  A course of therapy is recommended to help her to address this to avoid extended periods of voice loss, however, she feels that she has other medical priorities at this time, and will call to schedule therapy if or when this is more feasible for her.      TOTAL SERVICE TIME: 60 minutes  EVALUATION OF VOICE AND RESONANCE (81729)  NO CHARGE FACILITY FEE (73696)    Speech recognition software may have been used in this documentation; input is reviewed before signature to the best of my ability.     Dexter Brandt, Ph.D., [facility]-SLP  Speech-Language Pathologist-[facility]  [phone]  he/him/his

## 2022-03-14 NOTE — NURSING NOTE
"Chief Complaint   Patient presents with     RECHECK     Follow up       Pulse 74, height 1.676 m (5' 6\"), weight 88.5 kg (195 lb), SpO2 97 %.    Riki Hodge, EMT  "

## 2022-03-14 NOTE — PATIENT INSTRUCTIONS
1.  You were seen in the ENT Clinic today by Dr. Mcarthur.     2. Your next steps:   - Consideration of speech therapy   - Reflux management per your [facility] provider   - Referral to general ENT for right ear hearing loss and sinus pressure    3.  Plan is to return to clinic as needed following referral and therapy.    If you have any questions or concerns after your appointment, please call the clinic:   - Clinic phone: [phone].   - Ashley (Dr. Mcarthur's nurse): [phone]    Ashley Parson, RN, BSN  [facility]  Department of Otolaryngology  [facility]  [email or web address]

## 2022-03-14 NOTE — PROGRESS NOTES
"[facility] VOICE CLINIC  CLINICAL EVALUATION REPORT    Patient: Lashae Rivas  Date of Service: 3/14/2022  Seen in conjunction with: Dr. Sheila Mcarthur  Referring physician: Dr. Mcarthur    HISTORY  PATIENT INFORMATION  Rissa Rvias is a 62 year old female presenting today for repeat evaluation of voice and resonance. Salient details of her symptom history are as follows:    This patient was previously seen in our clinic on 11/25/2019.  Per Dr. 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  Her symptoms are likely exacerbated by difficulty with reflux, and this will soon be addressed with a hiatal hernia repair and Nissen in December with Dr. Trinh. I recommended speech therapy as initial primary management, with goals including reducing laryngeal irritability, improving laryngeal efficiency, decreasing vocal fold trauma, improving respiratory/phonatory coordination and improving phonatory mechanics.  Given her location, we recommended working with Shireen Jones CCC-SLP. If she is able to start therapy prior to surgery, she may find it helpful for cough management post-operatively.  I recommended that she plan to do speech therapy regardless of the impact of her hernia surgery, as I think it is highly likely that the muscular patterns we observe today will persist even if the reflux is addressed, given the chronicity of the symptoms.\"    Interval history  The patient underwent a course of therapy, which she feels helped her with improving her voice quality and with reducing frequency of coughing and throat clearing, although the symptoms were primarily related to reflux symptoms.  When the patient underwent Nissen fundoplication, her symptoms essentially resolved.  Unfortunately, she experienced significant coughing with COVID-19 in December 2020, and subsequently had failure of Nissen and " "return of coughing symptoms in tandem with return of reflux symptoms.  Over the course of the past year, she has had intermittent periods of almost total voice loss for up to 8 weeks at a time following periods of intense coughing in the context of reflux symptoms.  She is working to manage her GI symptoms with [employer].  The patient denies breathing difficulties, and denies swallowing difficulties.  Currently, her coughing and throat clearing are still somewhat of a problem, but continue to be markedly improved since she was initially seen by our team in 2019.  Regarding her voice, she feels that it is currently almost normal as it has been a long time since her last severe coughing fit.     OBJECTIVE FINDINGS  PATIENT REPORTED MEASURES  Patient Supplied Answers To [facility] Intake Voice Questionnaire  No flowsheet data found.     Patient Supplied Answers To VHI Questionnaire  Voice Handicap Index (VHI-10) 3/14/2022   My voice makes it difficult for people to hear me 2   People have difficulty understanding me in a noisy room 1   My voice difficulties restrict my personal and social life.  2   I feel left out of conversations because of my voice 0   My voice problem causes me to lose income 4   I feel as though I have to strain to produce voice 2   The clarity of my voice is unpredictable 3   My voice problem upsets me 2   My voice makes me feel handicapped 2   People ask, \"What's wrong with your voice?\" 2   VHI-10 20        Patient Supplied Answers To CSI Questionnaire  Cough Severity Index (CSI) 3/14/2022   My cough is worse when I lie down -   My coughing problem causes me to restrict my personal and social life 1   I tend to avoid places because of my cough problem 1   I feel embarrassed because of my coughing problem 1   People ask, ''What's wrong?'' because I cough a lot 0   I run out of air when I cough 1   My coughing problem affects my voice 2   My coughing problem limits my physical activity 1   My " coughing problem upsets me 2   People ask me if I am sick because I cough a lot 2   CSI Score 11        Patient Supplied Answers To EAT Questionnaire  Eating Assessment Tool (EAT-10) 3/14/2022   My swallowing problem has caused me to lose weight 0   My swallowing problem interferes with my ability to go out for meals 0   Swallowing liquids takes extra effort 1   Swallowing solids takes extra effort 1   Swallowing pills takes extra effort 1   Swallowing is painful 0   The pleasure of eating is affected by my swallowing 0   When I swallow food sticks in my throat 0   I cough when I eat 1   Swallowing is stressful 1   EAT-10 5           Self-Ratings as discussed with patient:  No flowsheet data found.     PERCEPTUAL EVALUATION (60297)  VOICE/ SPEECH/ NON-COMMUNICATIVE LARYNGEAL BEHAVIORS EVALUATION    Palpation of the laryngeal area shows:    firm musculature    tenderness of the thyrohyoid area    Breathing pattern:     inspirations are inadequate in volume and frequency    Cough/ Throat clear:    not observed today     Rissa states today is a typical voice day, with clinician observing voice quality characterized by:    Roughness: WNL    Breathiness: WNL    Strain: Mild    Habitual pitch is perceptually within normal limits and appropriate for age and gender    Loudness is WNL and is appropriate for the setting    GRADE, ROUGHNESS, BREATHINESS, ASTHENIA, STRAIN  (GRBAS) scale:  G(0)R(0)B(0)A(0)S(0)    LARYNGEAL EXAMINATION  Procedure: Flexible endoscopy with chip-tip technology with stroboscopy, left nostril; topical anesthesia with 3% Lidocaine and 0.25% phenylephrine was applied.   Performed by: Dr. Sheila Mcarthur  Verbal consent was obtained and witnessed prior to this procedure.   A time-out was performed, verifying patient, procedure, and site.   This exam shows:    Velar Function: Not assessed    Secretions: Within normal limits    Laryngeal Mucosa: essentially healthy laryngeal mucosa    Vocal fold mucosa:     o RTVF - within normal limits, no visible lesions  o LTVF - within normal limits, no visible lesions    Vocal fold function:   o Vocal folds are mobile and meet at midline  o Movement is brisk and symmetric  o Exam is neurologically normal     Airway is adequate    Elongation of the vocal folds for pitch increase is normal    Moderate to severe four-way constriction of the supraglottic larynx during connected speech    Therapy probes show marked reduction in supraglottic constriction when provided with cues to increase airflow and during singing    The addition of stroboscopy provided the following information:  o Vibratory Behavior: Present bilaterally  o Amplitude Right: WNL  o Amplitude Left: WNL  o Mucosal Wave Right: WNL  o Mucosal Wave Left: WNL  o Glottic closure:  on phonation glottic closure is normal   o Symmetry: Mostly symmetric  o Periodicity: Mostly periodic     STIMULABILITY: results of therapy probes during perceptual and laryngeal evaluation demonstrate improvement with coordination of respiration and phonation    ASSESSMENT / PLAN  IMPRESSIONS: Rissa Rivas is a 62 year old female with a history of Chronic Cough (R05.3) and Dysphonia (R49.0) characterized by strain.  Laryngeal exam demonstrates healthy laryngeal structure and normal laryngeal function with no evidence of chronic exposure to stomach acid.  Presence of laryngeal endoscope resulted in substantial protection/guarding behaviors of the larynx, which resulted in a dramatic but brief worsening of voice quality, which resolved with cues to increase airflow during voicing, and following completion of today's exam.  Because the patient has very clear nonbehavioral triggers for her coughing (untreated reflux), and because she appears to have maximal control of cough suppression strategies, therapy for cough suppression is not currently recommended.  Regarding voice, however, she appears to adopt a pattern of substantial laryngeal  hyperfunction in response to laryngeal irritation, which could occur with coughing with or without reflux.  A course of therapy is recommended to help her to address this to avoid extended periods of voice loss, however, she feels that she has other medical priorities at this time, and will call to schedule therapy if or when this is more feasible for her.      TOTAL SERVICE TIME: 60 minutes  EVALUATION OF VOICE AND RESONANCE (77106)  NO CHARGE FACILITY FEE (02680)    Speech recognition software may have been used in this documentation; input is reviewed before signature to the best of my ability.     Dexter Brandt, Ph.D., [facility]-SLP  Speech-Language Pathologist-[facility]  [phone]  he/him/his

## 2022-03-14 NOTE — LETTER
3/14/2022      RE: Lashae Rivas  [address]  [place] [address]       Dear Colleague:    Lashae Rivas recently returned for follow-up at the [facility]. My clinic note from our visit is enclosed below.  Speech recognition software may have been used in the documentation below; input is reviewed before signature to the best of my ability.     I appreciate the ongoing opportunity to participate in this patient's care.    Please feel free to contact me with any questions.    Sincerely yours,      Sheila Mcarthur M.D., M.P.H.  , Laryngology  Director, [facility]  Otolaryngology- Head & Neck Surgery  [phone]            =====  HISTORY OF PRESENT ILLNESS:  Lashae Rivas is a pleasant 62-year-old female with a history of muscle tension dysphonia and irritable larynx symptoms including dysphagia, globus, and chronic cough, in the context of DM1, Hashimoto's, spine surgeries.      She was seen once before in this clinic in November 2019, and at that time it was felt that her symptoms were likely exacerbated by difficulty with reflux. At that time, a hiatal hernia repair and Nissen was pending with Dr. Trinh. At that time, we also recommended speech therapy with Shireen Jones, SLP.    Updates since last visit:  1) Worked with Shireen Jones for SLP.  2) Nissen fundoplication procedure x 2 was unsuccessful and she continues to manage with hiatal hernia, GERD with esophagitis.  3) She had a period of reduced cough with the fundoplications in place, but they did not hold  4) She was seen in Rheumatology Dr. Ramirez 3/8/22 for evaluation of small joint issues. This was thought to be osteoarthritis.  5) She had COVID in December 2020, and had terrible coughing, and lost her voice for two months.  6) She has lost her voice three times in the past six to seven months.  7) She still has a tough time with reflux now that Nissen did not hold. She  "takes Gaviscon, PPIs.  8) She has some right sinus discomfort.  9) She states that she has been told that the reflux is not responsible for her cough. Still uses exercises she used in speech pathology, which can help.  10) Endoscopy in January 2022 showed esophagitis.  11) pH probe abnormal.      Records reviewed:  3/8/22 Dr. Ramirez  10/19/21 Dr. Page  3/26/20 Shireen Jones, SLP        MEDICATIONS:     Current Outpatient Medications   Medication Sig Dispense Refill     albuterol (PROVENTIL) (2.5 MG/3ML) 0.083% neb solution NEBZULIZE 1 VIAL EVERY 6 HOURS AS NEEDED FOR SHORTNESS OF BREATH OR WHEEZING  1     aspirin (ASA) 81 MG chewable tablet Take 81 mg by mouth daily       atorvastatin (LIPITOR) 20 MG tablet TAKE 1 TABLET (20 MG TOTAL) BY MOUTH EVERY NIGHT AT BEDTIME  4     B-D ALLERGY SYRINGE 28G X 1/2\" 1 ML MISC USE 3 SYRINGES WEEKLY AS DIRECTED  11     diltiazem ER COATED BEADS (CARDIZEM CD/CARTIA XT) 180 MG 24 hr capsule TAKE 1 CAPSULE (180 MG) BY MOUTH 1 TIME PER DAY  4     EPINEPHrine (EPIPEN/ADRENACLICK/OR ANY BX GENERIC EQUIV) 0.3 MG/0.3ML injection 2-pack INJECT 1 SYRINGE INTRAMUSCULARLY 1 TIME WHEN NEEDED FOR ALLERGIC REACTION  0     famotidine (PEPCID) 10 MG tablet Take 30 mg by mouth 2 times daily       fexofenadine (ALLEGRA) 180 MG tablet Take 180 mg by mouth daily       GLUCAGON EMERGENCY 1 MG kit ADMINISTER 1 MG INTRAVENOUSLY AS NEEDED FOR LOW BLOOD SUGAR  1     HUMALOG 100 UNIT/ML injection USE UP TO 70 UNITS DAILY IN INSULIN PUMP  2     LANsoprazole (PREVACID) 30 MG DR capsule TAKE 1 CAPSULE BY MOUTH TWICE A DAY BEFORE A MEAL  11     levothyroxine (SYNTHROID/LEVOTHROID) 25 MCG tablet TAKE 1 TABLET (25 MCG) BY MOUTH 1 TIME PER DAY OMIT ON THE 7TH DAY OF THE WEEK  3     montelukast (SINGULAIR) 10 MG tablet TAKE 1 TABLET (10 MG) BY MOUTH 1 TIME A DAY IN THE EVENING  4     nitroGLYcerin (NITROSTAT) 0.4 MG sublingual tablet DISSOLVE 1 TABLET UNDER THE TONGUE EVERY 5 MINUTES AS NEEDED FOR CHEST PAIN.  " 0     ranitidine (ZANTAC) 300 MG tablet TAKE 1 TABLET BY MOUTH EVERYDAY AT BEDTIME  11     simvastatin (ZOCOR) 20 MG tablet Take 20 mg by mouth At Bedtime       UNABLE TO FIND MEDICATION NAME: Weekly allergy shots       UNABLE TO FIND 20 mg MEDICATION NAME: simvistatin, 20mg         ALLERGIES:    Allergies   Allergen Reactions     Other [Seasonal Allergies] Shortness Of Breath and Rash     Perfumes and other aromas, grasses, pine, pollen, mite, mold, diesel, nickel, birch, oak     Pcn [Penicillins] Anaphylaxis     Wasps [Hornets] Anaphylaxis     Food Other (See Comments)     Reactions: vomiting, nausea    Cow's milk, cheddar cheese, cottage cheese, egg whites and yolk, malt, wheat, rye, gluten, barley, honey     Bactrim [Sulfamethoxazole W/Trimethoprim] Rash     Imdur [Isosorbide] Other (See Comments)     No more per Cardio & PharmD     Nickel Rash     Other [No Clinical Screening - See Comments] Other (See Comments)     Reactions: nausea, vomiting    Pineapple, grapefruit, pear, coconut, lashanda, clam, scallop, squash, brewers yeast, zucchini, garlic, mozzarella cheese, yogurt, corn, baker's yeast       NEW PMH/PSH: None    REVIEW OF SYSTEMS:  The patient completed a comprehensive 11 point review of systems (below), which was reviewed. Positives are as noted below.  Patient Supplied Answers to Review of Systems   ENT ROS 11/25/2019   Constitutional Appetite change, Unexplained fatigue   Neurology Headache   Ears, Nose, Throat Nasal congestion or drainage, Sore throat, Trouble swallowing, Hoarseness, Coughing up blood   Cardiopulmonary Cough   Gastrointestinal/Genitourinary Heartburn/indigestion, Unexplained nausea/vomiting   Musculoskeletal Sore or stiff joints   Allergy/Immunology Allergies or hay fever, Skin changes       PHYSICAL EXAM:  General: The patient was alert and conversant, and in no acute distress.    Oral cavity/oropharynx: No masses or lesions. Dentition unchanged since prior. Tongue mobility and  palate elevation intact and symmetric.  Neck: No palpable cervical lymphadenopathy, mild tenderness to palpation of the thyrohyoid space, which was narrow. No obvious thyroid abnormality.  Resp: Breathing comfortably, no stridor or stertor.  Neuro: Symmetric facial function. Other cranial nerve function as documented above.  Psych: Normal affect, pleasant and cooperative.  Voice/speech: No significant dysphonia of speaking voice; limited respiratory recruitment.      Intake scores  Last 2 Scores for Patient-Answered VHI Questionnaire  No flowsheet data found.   Last 2 Scores for Patient-Answered EAT Questionnaire  No flowsheet data found.   Last 2 Scores for Patient-Answered CSI Questionnaire  No flowsheet data found.    Procedure:   Flexible fiberoptic laryngoscopy and laryngovideostroboscopy  Indications: This procedure was warranted to evaluate the patient's laryngeal anatomy and function. Risks, benefits, and alternatives were discussed.  Description: After written informed consent was obtained, a time-out was performed to confirm patient identity, procedure, and procedure site. Topical 3% lidocaine with 0.25% phenylephrine was applied to the nasal cavities. I performed the endoscopy and no complications were apparent. Continuous and stroboscopic light were utilized to assess routine phonation and variable frequency phonation.  Performed by: Sheila Mcarthur MD MPH  SLP: Dexter Brandt, PhD, CCC-SLP   Findings: Normal nasopharynx. Normal base of tongue, valleculae, and epiglottis. Vocal fold mobility: right: normal; left: normal. Medial edges of the vocal folds: smooth and straight. No focal mucosal lesions were observed on the true vocal folds. Glissade produced appropriate elongation. There was mild to moderate supraglottic recruitment with connected speech. Mucosa of false vocal folds, aryepiglottic folds, piriform sinuses, and posterior glottis unremarkable. Airway was patent.   No lesions on NBI.    The  addition of stroboscopy allowed evaluation of the mucosal wave.   Amplitude: right: normal; left: normal. Symmetry: intermittent symmetry. Closure pattern: complete. Closure plane: at glottic level. Phase distribution: normal.              IMPRESSION AND PLAN:   Lashae Rivas returns with intermittent dysphonia following COVID. She also notes ongoing reflux symptom following failure of Nissen fundoplication.    Her cough seems at least partially attributable to reflux, given improvement with Nissen and worsening after it came undone. Other contributors include allergies and post-COVID. Laryngeal component seems less prominent based on exam today. Cough is overall still much improved.    She now lives in [place] so it is easier to access care.    Plan:  1) We discussed consideration of speech therapy as initial primary management for the dysphonia given her prolonged symptoms, with goals including reducing laryngeal irritability, improving laryngeal efficiency, improving respiratory/phonatory coordination and improving phonatory mechanics.  She will wait on this since her symptoms are currently much better than they were.  2) Reflux management per [facility].  3) For sinus pressure and subjective right hearing loss, recommended audiogram and referral to general or subspecialty colleagues depending on findings.    Today's visit required additional screening time, PPE, and cleaning measures to allow for a safe in-person visit, due to the public health emergency. I spent a total of 38 minutes on 3/14/2022 in chart review, review of tests, patient visit, documentation, care coordination, and/or discussion with other providers about the issues documented above, separate from any documented procedure(s).        Sheila Mcarthur MD

## 2022-03-14 NOTE — PROGRESS NOTES
Dear Colleague:    Lashae Rivas recently returned for follow-up at the [facility]. My clinic note from our visit is enclosed below.  Speech recognition software may have been used in the documentation below; input is reviewed before signature to the best of my ability.     I appreciate the ongoing opportunity to participate in this patient's care.    Please feel free to contact me with any questions.    Sincerely yours,      Sheila Mcarthur M.D., M.P.H.  , Laryngology  Director, [facility]  Otolaryngology- Head & Neck Surgery  [phone]            =====  HISTORY OF PRESENT ILLNESS:  Lashae Rivas is a pleasant 62-year-old female with a history of muscle tension dysphonia and irritable larynx symptoms including dysphagia, globus, and chronic cough, in the context of DM1, Hashimoto's, spine surgeries.      She was seen once before in this clinic in November 2019, and at that time it was felt that her symptoms were likely exacerbated by difficulty with reflux. At that time, a hiatal hernia repair and Nissen was pending with Dr. Trinh. At that time, we also recommended speech therapy with Shireen Jones, SLP.    Updates since last visit:  1) Worked with Shireen Jones for SLP.  2) Nissen fundoplication procedure x 2 was unsuccessful and she continues to manage with hiatal hernia, GERD with esophagitis.  3) She had a period of reduced cough with the fundoplications in place, but they did not hold  4) She was seen in Rheumatology Dr. Ramirez 3/8/22 for evaluation of small joint issues. This was thought to be osteoarthritis.  5) She had COVID in December 2020, and had terrible coughing, and lost her voice for two months.  6) She has lost her voice three times in the past six to seven months.  7) She still has a tough time with reflux now that Nissen did not hold. She takes Gaviscon, PPIs.  8) She has some right sinus discomfort.  9) She states that she  "has been told that the reflux is not responsible for her cough. Still uses exercises she used in speech pathology, which can help.  10) Endoscopy in January 2022 showed esophagitis.  11) pH probe abnormal.      Records reviewed:  3/8/22 Dr. Ramirez  10/19/21 Dr. Page  3/26/20 Shireen Jones, SLP        MEDICATIONS:     Current Outpatient Medications   Medication Sig Dispense Refill     albuterol (PROVENTIL) (2.5 MG/3ML) 0.083% neb solution NEBZULIZE 1 VIAL EVERY 6 HOURS AS NEEDED FOR SHORTNESS OF BREATH OR WHEEZING  1     aspirin (ASA) 81 MG chewable tablet Take 81 mg by mouth daily       atorvastatin (LIPITOR) 20 MG tablet TAKE 1 TABLET (20 MG TOTAL) BY MOUTH EVERY NIGHT AT BEDTIME  4     B-D ALLERGY SYRINGE 28G X 1/2\" 1 ML MISC USE 3 SYRINGES WEEKLY AS DIRECTED  11     diltiazem ER COATED BEADS (CARDIZEM CD/CARTIA XT) 180 MG 24 hr capsule TAKE 1 CAPSULE (180 MG) BY MOUTH 1 TIME PER DAY  4     EPINEPHrine (EPIPEN/ADRENACLICK/OR ANY BX GENERIC EQUIV) 0.3 MG/0.3ML injection 2-pack INJECT 1 SYRINGE INTRAMUSCULARLY 1 TIME WHEN NEEDED FOR ALLERGIC REACTION  0     famotidine (PEPCID) 10 MG tablet Take 30 mg by mouth 2 times daily       fexofenadine (ALLEGRA) 180 MG tablet Take 180 mg by mouth daily       GLUCAGON EMERGENCY 1 MG kit ADMINISTER 1 MG INTRAVENOUSLY AS NEEDED FOR LOW BLOOD SUGAR  1     HUMALOG 100 UNIT/ML injection USE UP TO 70 UNITS DAILY IN INSULIN PUMP  2     LANsoprazole (PREVACID) 30 MG DR capsule TAKE 1 CAPSULE BY MOUTH TWICE A DAY BEFORE A MEAL  11     levothyroxine (SYNTHROID/LEVOTHROID) 25 MCG tablet TAKE 1 TABLET (25 MCG) BY MOUTH 1 TIME PER DAY OMIT ON THE 7TH DAY OF THE WEEK  3     montelukast (SINGULAIR) 10 MG tablet TAKE 1 TABLET (10 MG) BY MOUTH 1 TIME A DAY IN THE EVENING  4     nitroGLYcerin (NITROSTAT) 0.4 MG sublingual tablet DISSOLVE 1 TABLET UNDER THE TONGUE EVERY 5 MINUTES AS NEEDED FOR CHEST PAIN.  0     ranitidine (ZANTAC) 300 MG tablet TAKE 1 TABLET BY MOUTH EVERYDAY AT BEDTIME  11 "     simvastatin (ZOCOR) 20 MG tablet Take 20 mg by mouth At Bedtime       UNABLE TO FIND MEDICATION NAME: Weekly allergy shots       UNABLE TO FIND 20 mg MEDICATION NAME: simvistatin, 20mg         ALLERGIES:    Allergies   Allergen Reactions     Other [Seasonal Allergies] Shortness Of Breath and Rash     Perfumes and other aromas, grasses, pine, pollen, mite, mold, diesel, nickel, birch, oak     Pcn [Penicillins] Anaphylaxis     Wasps [Hornets] Anaphylaxis     Food Other (See Comments)     Reactions: vomiting, nausea    Cow's milk, cheddar cheese, cottage cheese, egg whites and yolk, malt, wheat, rye, gluten, barley, honey     Bactrim [Sulfamethoxazole W/Trimethoprim] Rash     Imdur [Isosorbide] Other (See Comments)     No more per Cardio & PharmD     Nickel Rash     Other [No Clinical Screening - See Comments] Other (See Comments)     Reactions: nausea, vomiting    Pineapple, grapefruit, pear, coconut, lashanda, clam, scallop, squash, brewers yeast, zucchini, garlic, mozzarella cheese, yogurt, corn, baker's yeast       NEW PMH/PSH: None    REVIEW OF SYSTEMS:  The patient completed a comprehensive 11 point review of systems (below), which was reviewed. Positives are as noted below.  Patient Supplied Answers to Review of Systems   ENT ROS 11/25/2019   Constitutional Appetite change, Unexplained fatigue   Neurology Headache   Ears, Nose, Throat Nasal congestion or drainage, Sore throat, Trouble swallowing, Hoarseness, Coughing up blood   Cardiopulmonary Cough   Gastrointestinal/Genitourinary Heartburn/indigestion, Unexplained nausea/vomiting   Musculoskeletal Sore or stiff joints   Allergy/Immunology Allergies or hay fever, Skin changes       PHYSICAL EXAM:  General: The patient was alert and conversant, and in no acute distress.    Oral cavity/oropharynx: No masses or lesions. Dentition unchanged since prior. Tongue mobility and palate elevation intact and symmetric.  Neck: No palpable cervical lymphadenopathy, mild  tenderness to palpation of the thyrohyoid space, which was narrow. No obvious thyroid abnormality.  Resp: Breathing comfortably, no stridor or stertor.  Neuro: Symmetric facial function. Other cranial nerve function as documented above.  Psych: Normal affect, pleasant and cooperative.  Voice/speech: No significant dysphonia of speaking voice; limited respiratory recruitment.      Intake scores  Last 2 Scores for Patient-Answered VHI Questionnaire  No flowsheet data found.   Last 2 Scores for Patient-Answered EAT Questionnaire  No flowsheet data found.   Last 2 Scores for Patient-Answered CSI Questionnaire  No flowsheet data found.    Procedure:   Flexible fiberoptic laryngoscopy and laryngovideostroboscopy  Indications: This procedure was warranted to evaluate the patient's laryngeal anatomy and function. Risks, benefits, and alternatives were discussed.  Description: After written informed consent was obtained, a time-out was performed to confirm patient identity, procedure, and procedure site. Topical 3% lidocaine with 0.25% phenylephrine was applied to the nasal cavities. I performed the endoscopy and no complications were apparent. Continuous and stroboscopic light were utilized to assess routine phonation and variable frequency phonation.  Performed by: Sheila Mcarthur MD MPH  SLP: Dexter Brandt, PhD, CCC-SLP   Findings: Normal nasopharynx. Normal base of tongue, valleculae, and epiglottis. Vocal fold mobility: right: normal; left: normal. Medial edges of the vocal folds: smooth and straight. No focal mucosal lesions were observed on the true vocal folds. Glissade produced appropriate elongation. There was mild to moderate supraglottic recruitment with connected speech. Mucosa of false vocal folds, aryepiglottic folds, piriform sinuses, and posterior glottis unremarkable. Airway was patent.   No lesions on NBI.    The addition of stroboscopy allowed evaluation of the mucosal wave.   Amplitude: right:  normal; left: normal. Symmetry: intermittent symmetry. Closure pattern: complete. Closure plane: at glottic level. Phase distribution: normal.              IMPRESSION AND PLAN:   Lashae Rivas returns with intermittent dysphonia following COVID. She also notes ongoing reflux symptom following failure of Nissen fundoplication.    Her cough seems at least partially attributable to reflux, given improvement with Nissen and worsening after it came undone. Other contributors include allergies and post-COVID. Laryngeal component seems less prominent based on exam today. Cough is overall still much improved.    She now lives in [place] so it is easier to access care.    Plan:  1) We discussed consideration of speech therapy as initial primary management for the dysphonia given her prolonged symptoms, with goals including reducing laryngeal irritability, improving laryngeal efficiency, improving respiratory/phonatory coordination and improving phonatory mechanics.  She will wait on this since her symptoms are currently much better than they were.  2) Reflux management per [facility].  3) For sinus pressure and subjective right hearing loss, recommended audiogram and referral to general or subspecialty colleagues depending on findings.    Today's visit required additional screening time, PPE, and cleaning measures to allow for a safe in-person visit, due to the public health emergency. I spent a total of 38 minutes on 3/14/2022 in chart review, review of tests, patient visit, documentation, care coordination, and/or discussion with other providers about the issues documented above, separate from any documented procedure(s).

## 2022-03-16 NOTE — TELEPHONE ENCOUNTER
FUTURE VISIT INFORMATION      FUTURE VISIT INFORMATION:    Date: 4/28/22    Time: 12:10PM    Location: [facility]  REFERRAL INFORMATION:    Referring provider:  Dr Sheila Mcarthur    Referring providers clinic:  [facility] [place]     Reason for visit/diagnosis  Ref by Dr. Mcarthur for Right Hearing Loss and Sinus Pressure with WIN prior    RECORDS REQUESTED FROM:       Clinic name Comments Records Status Imaging Status   [facility]  3/14/22 note from Dr Sheila Mcarthur and Dexter Brandt, SLP   [facility]    ENT    10/3/19 note from Navid Huynh, Agus DIXON MD [facility]     [facility]   [place], [place] 1/17/19 CTA Head   12/24/18 MR Brain  [facility]  req 3/16/22 - PACS                       3/16/22 8:25AM Sent a fax to [facility] for images - [facility]   3/21/22 11:32AM images received in PACS - [facility]

## 2022-03-26 ENCOUNTER — HEALTH MAINTENANCE LETTER (OUTPATIENT)
Age: 62
End: 2022-03-26

## 2022-04-08 DIAGNOSIS — Z01.10 ENCOUNTER FOR HEARING TEST: Primary | ICD-10-CM

## 2022-04-27 ENCOUNTER — APPOINTMENT (OUTPATIENT)
Dept: URBAN - METROPOLITAN AREA CLINIC 256 | Age: 62
Setting detail: DERMATOLOGY
End: 2022-04-27

## 2022-04-27 VITALS — WEIGHT: 198 LBS | HEIGHT: 67 IN | RESPIRATION RATE: 15 BRPM

## 2022-04-27 DIAGNOSIS — D485 NEOPLASM OF UNCERTAIN BEHAVIOR OF SKIN: ICD-10-CM

## 2022-04-27 DIAGNOSIS — L82.0 INFLAMED SEBORRHEIC KERATOSIS: ICD-10-CM

## 2022-04-27 DIAGNOSIS — Z71.89 OTHER SPECIFIED COUNSELING: ICD-10-CM

## 2022-04-27 DIAGNOSIS — D22 MELANOCYTIC NEVI: ICD-10-CM

## 2022-04-27 DIAGNOSIS — L82.1 OTHER SEBORRHEIC KERATOSIS: ICD-10-CM

## 2022-04-27 DIAGNOSIS — L81.4 OTHER MELANIN HYPERPIGMENTATION: ICD-10-CM

## 2022-04-27 PROBLEM — D22.5 MELANOCYTIC NEVI OF TRUNK: Status: ACTIVE | Noted: 2022-04-27

## 2022-04-27 PROBLEM — D48.5 NEOPLASM OF UNCERTAIN BEHAVIOR OF SKIN: Status: ACTIVE | Noted: 2022-04-27

## 2022-04-27 PROCEDURE — OTHER MIPS QUALITY: OTHER

## 2022-04-27 PROCEDURE — OTHER BIOPSY BY SHAVE METHOD: OTHER

## 2022-04-27 PROCEDURE — OTHER MONITORING: OTHER

## 2022-04-27 PROCEDURE — OTHER ADDITIONAL NOTES: OTHER

## 2022-04-27 PROCEDURE — OTHER COUNSELING: OTHER

## 2022-04-27 PROCEDURE — 17110 DESTRUCT B9 LESION 1-14: CPT

## 2022-04-27 PROCEDURE — 99203 OFFICE O/P NEW LOW 30 MIN: CPT | Mod: 25

## 2022-04-27 PROCEDURE — 11102 TANGNTL BX SKIN SINGLE LES: CPT | Mod: 59

## 2022-04-27 PROCEDURE — OTHER LIQUID NITROGEN: OTHER

## 2022-04-27 ASSESSMENT — LOCATION DETAILED DESCRIPTION DERM
LOCATION DETAILED: RIGHT ANTERIOR DISTAL THIGH
LOCATION DETAILED: RIGHT RADIAL DORSAL HAND
LOCATION DETAILED: RIGHT MID-UPPER BACK
LOCATION DETAILED: LEFT SUPERIOR UPPER BACK

## 2022-04-27 ASSESSMENT — LOCATION SIMPLE DESCRIPTION DERM
LOCATION SIMPLE: RIGHT HAND
LOCATION SIMPLE: RIGHT UPPER BACK
LOCATION SIMPLE: LEFT UPPER BACK
LOCATION SIMPLE: RIGHT THIGH

## 2022-04-27 ASSESSMENT — LOCATION ZONE DERM
LOCATION ZONE: LEG
LOCATION ZONE: TRUNK
LOCATION ZONE: HAND

## 2022-04-27 NOTE — PROCEDURE: BIOPSY BY SHAVE METHOD
Wound Care: Petrolatum
Type Of Destruction Used: Curettage
Additional Anesthesia Volume In Cc (Will Not Render If 0): 0
Information: Selecting Yes will display possible errors in your note based on the variables you have selected. This validation is only offered as a suggestion for you. PLEASE NOTE THAT THE VALIDATION TEXT WILL BE REMOVED WHEN YOU FINALIZE YOUR NOTE. IF YOU WANT TO FAX A PRELIMINARY NOTE YOU WILL NEED TO TOGGLE THIS TO 'NO' IF YOU DO NOT WANT IT IN YOUR FAXED NOTE.
Biopsy Type: H and E
Validate Anticipated Plan: No
Electrodesiccation Text: The wound bed was treated with electrodesiccation after the biopsy was performed.
Detail Level: Detailed
Consent: Written consent was obtained and risks were reviewed including but not limited to scarring, infection, bleeding, scabbing, incomplete removal, nerve damage and allergy to anesthesia.
Hemostasis: Drysol
Was A Bandage Applied: Yes
Anesthesia Type: 1% lidocaine with epinephrine
Post-Care Instructions: I reviewed with the patient in detail post-care instructions. Patient is to keep the biopsy site dry overnight, and then apply bacitracin twice daily until healed. Patient may apply hydrogen peroxide soaks to remove any crusting.
Silver Nitrate Text: The wound bed was treated with silver nitrate after the biopsy was performed.
Depth Of Biopsy: dermis
Electrodesiccation And Curettage Text: The wound bed was treated with electrodesiccation and curettage after the biopsy was performed.
Billing Type: Third-Party Bill
Cryotherapy Text: The wound bed was treated with cryotherapy after the biopsy was performed.
Anesthesia Volume In Cc (Will Not Render If 0): 0.3
Curettage Text: The wound bed was treated with curettage after the biopsy was performed.
Notification Instructions: Patient will be notified of biopsy results. However, patient instructed to call the office if not contacted within 2 weeks.
Dressing: bandage
Biopsy Method: Personna blade

## 2022-04-27 NOTE — PROCEDURE: COUNSELING
Detail Level: Detailed
Detail Level: Zone
Patient Specific Counseling (Will Not Stick From Patient To Patient): Patient to follow up in 6 weeks if lesion does not resolve with today’s treatment

## 2022-04-27 NOTE — PROCEDURE: LIQUID NITROGEN
Detail Level: Detailed
Render Note In Bullet Format When Appropriate: No
Consent: The patient's consent was obtained including but not limited to risks of crusting, scabbing, blistering, scarring, darker or lighter pigmentary change, recurrence, incomplete removal and infection.
Show Applicator Variable?: Yes
Medical Necessity Information: It is in your best interest to select a reason for this procedure from the list below. All of these items fulfill various CMS LCD requirements except the new and changing color options.
Post-Care Instructions: I reviewed with the patient in detail post-care instructions. Patient is to wear sunprotection, and avoid picking at any of the treated lesions. Pt may apply Vaseline to crusted or scabbing areas.
Spray Paint Text: The liquid nitrogen was applied to the skin utilizing a spray paint frosting technique.
Medical Necessity Clause: This procedure was medically necessary because the lesions that were treated were:

## 2022-04-27 NOTE — PROCEDURE: ADDITIONAL NOTES
Detail Level: Simple
Additional Notes: A clinical assistant was present for the exam. Care instructions of treated site were explained to the patient in detail. Told patient to call with any concerns or questions. The patient verbalized understanding and agreement of the education provided and the treatment plan. Encouraged patient to schedule a follow up appointment right after visit. At the end of the visit, all questions had been answered and the patient was satisfied with the visit.
Render Risk Assessment In Note?: no

## 2022-04-27 NOTE — HPI: FULL BODY SKIN EXAMINATION
What Type Of Note Output Would You Prefer (Optional)?: Standard Output
What Is The Reason For Today's Visit?: Full Body Skin Examination
What Is The Reason For Today's Visit? (Being Monitored For X): concerning skin lesions on an annual basis
Additional History: FBE. Concerns with moles today.

## 2022-04-27 NOTE — PROCEDURE: MONITORING
Detail Level: Detailed
Patient Specific Counseling (Will Not Stick From Patient To Patient): Follow up in 6 weeks if lesion does not resolve

## 2022-04-28 ENCOUNTER — PRE VISIT (OUTPATIENT)
Dept: OTOLARYNGOLOGY | Facility: CLINIC | Age: 62
End: 2022-04-28

## 2022-04-28 ENCOUNTER — OFFICE VISIT (OUTPATIENT)
Dept: OTOLARYNGOLOGY | Facility: CLINIC | Age: 62
End: 2022-04-28
Payer: COMMERCIAL

## 2022-04-28 ENCOUNTER — OFFICE VISIT (OUTPATIENT)
Dept: AUDIOLOGY | Facility: CLINIC | Age: 62
End: 2022-04-28

## 2022-04-28 VITALS
WEIGHT: 200 LBS | HEIGHT: 66 IN | OXYGEN SATURATION: 97 % | SYSTOLIC BLOOD PRESSURE: 128 MMHG | HEART RATE: 69 BPM | BODY MASS INDEX: 32.14 KG/M2 | DIASTOLIC BLOOD PRESSURE: 69 MMHG | TEMPERATURE: 97.1 F

## 2022-04-28 DIAGNOSIS — H90.3 BILATERAL SENSORINEURAL HEARING LOSS: Primary | ICD-10-CM

## 2022-04-28 DIAGNOSIS — Z01.10 ENCOUNTER FOR HEARING TEST: ICD-10-CM

## 2022-04-28 DIAGNOSIS — H90.3 SENSORINEURAL HEARING LOSS (SNHL) OF BOTH EARS: Primary | ICD-10-CM

## 2022-04-28 DIAGNOSIS — H92.01 OTALGIA, RIGHT: ICD-10-CM

## 2022-04-28 PROCEDURE — 92567 TYMPANOMETRY: CPT | Performed by: AUDIOLOGIST

## 2022-04-28 PROCEDURE — 92557 COMPREHENSIVE HEARING TEST: CPT | Performed by: AUDIOLOGIST

## 2022-04-28 PROCEDURE — 99203 OFFICE O/P NEW LOW 30 MIN: CPT | Performed by: REGISTERED NURSE

## 2022-04-28 ASSESSMENT — PAIN SCALES - GENERAL: PAINLEVEL: NO PAIN (0)

## 2022-04-28 NOTE — PATIENT INSTRUCTIONS
- You were seen in the ENT Clinic today by Juliana Abarca NP.   - Follow up in 6 months with audiogram and appointment with Juliana  - Please send a [employer] message or call the ENT clinic at [phone] with any questions or concerns.

## 2022-04-28 NOTE — PROGRESS NOTES
AUDIOLOGY REPORT     SUMMARY: Audiology visit completed. See audiogram for results.       RECOMMENDATIONS: Follow-up with ENT.     Saji Lopez CCC-A  Licensed Audiologist   [place] #[id number]

## 2022-04-28 NOTE — LETTER
4/28/2022       RE: Lashae Rivas  [address]  [place] [address]     Dear Colleague,    Thank you for referring your patient, Lashae Rivas, to the [facility] [place] at [facility]. Please see a copy of my visit note below.      Otolaryngology Clinic  April 28, 2022    Chief Complaint:    Right aural fullness and otalgia.       History of Present Illness:   Lashae Rivas is a 62 year old female who presents today for evaluation of right sided ear fullness and pain. Patient reports a history of bilateral tinnitus and hearing loss with right worse than left that occurred 3 months after patient had covid in December 2020. [place] that hearing was down bilaterally which slowly improved. Reports an episode of significant right sided hearing loss about 2 months ago, this has also slowly improved almost back to baseline. Patient still has tinnitus (or hum) on the right side that is intermittent and patient has noticed this noise the past couple of mornings. Reports dizziness with fast changes in position, this lasts only seconds.    Patient denies any otalgia, otorrhea, history of frequent ear infections in adulthood, or ear surgeries. 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 Family history of hearing loss. Sister and mother with hearing aids.     Past Medical History:  Past Medical History:   Diagnosis Date     Adjustment disorder with depressed mood 11/25/2019     Arthropathy of hand 11/25/2019     Asthma 11/25/2019     Gastroesophageal reflux disease without esophagitis 11/25/2019     Hypothyroidism 11/25/2019     Insulin pump status 11/25/2019     Myopia 11/25/2019     Nontoxic uninodular goiter 11/25/2019     Osteoarthrosis, hand 11/25/2019     Prinzmetal angina (HCC) 11/25/2019     Pure hypercholesterolemia 11/25/2019     Sebaceous cyst 11/25/2019     Senile cataract 11/25/2019      "Tear film insufficiency 11/25/2019     Type 1 diabetes mellitus (HCC) 11/25/2019     Undiagnosed cardiac murmers 11/25/2019       Past Surgical History:  No past surgical history on file.    Medications:  Current Outpatient Medications   Medication Sig Dispense Refill     acetaminophen (TYLENOL) 650 MG CR tablet        albuterol (PROVENTIL) (2.5 MG/3ML) 0.083% neb solution NEBZULIZE 1 VIAL EVERY 6 HOURS AS NEEDED FOR SHORTNESS OF BREATH OR WHEEZING  1     aspirin (ASA) 81 MG chewable tablet Take 81 mg by mouth daily       atorvastatin (LIPITOR) 20 MG tablet TAKE 1 TABLET (20 MG TOTAL) BY MOUTH EVERY NIGHT AT BEDTIME  4     B-D ALLERGY SYRINGE 28G X 1/2\" 1 ML MISC USE 3 SYRINGES WEEKLY AS DIRECTED  11     diclofenac (VOLTAREN) 1 % topical gel Apply topically 4 times daily       diltiazem ER COATED BEADS (CARDIZEM CD/CARTIA XT) 180 MG 24 hr capsule TAKE 1 CAPSULE (180 MG) BY MOUTH 1 TIME PER DAY  4     EPINEPHrine (EPIPEN/ADRENACLICK/OR ANY BX GENERIC EQUIV) 0.3 MG/0.3ML injection 2-pack INJECT 1 SYRINGE INTRAMUSCULARLY 1 TIME WHEN NEEDED FOR ALLERGIC REACTION  0     famotidine (PEPCID) 10 MG tablet Take 30 mg by mouth 2 times daily       fexofenadine (ALLEGRA) 180 MG tablet Take 180 mg by mouth daily        Fish Oil-Krill Oil (OMEGA-3 DUAL SPECTRUM PO)        GLUCAGON EMERGENCY 1 MG kit ADMINISTER 1 MG INTRAVENOUSLY AS NEEDED FOR LOW BLOOD SUGAR  1     HUMALOG 100 UNIT/ML injection USE UP TO 70 UNITS DAILY IN INSULIN PUMP  2     LANsoprazole (PREVACID) 30 MG DR capsule TAKE 1 CAPSULE BY MOUTH TWICE A DAY BEFORE A MEAL  11     levothyroxine (SYNTHROID/LEVOTHROID) 25 MCG tablet TAKE 1 TABLET (25 MCG) BY MOUTH 1 TIME PER DAY OMIT ON THE 7TH DAY OF THE WEEK  3     losartan (COZAAR) 25 MG tablet TAKE 0.5 TABLETS BY MOUTH ONCE DAILY.       montelukast (SINGULAIR) 10 MG tablet TAKE 1 TABLET (10 MG) BY MOUTH 1 TIME A DAY IN THE EVENING  4     nitroGLYcerin (NITROSTAT) 0.4 MG sublingual tablet DISSOLVE 1 TABLET UNDER THE " "TONGUE EVERY 5 MINUTES AS NEEDED FOR CHEST PAIN.  0     pantoprazole (PROTONIX) 40 MG EC tablet Take 40 mg by mouth       ranitidine (ZANTAC) 300 MG tablet TAKE 1 TABLET BY MOUTH EVERYDAY AT BEDTIME  11     simvastatin (ZOCOR) 20 MG tablet Take 20 mg by mouth At Bedtime       UNABLE TO FIND MEDICATION NAME: Weekly allergy shots       UNABLE TO FIND 20 mg MEDICATION NAME: simvistatin, 20mg         Allergies:  Allergies   Allergen Reactions     Other [Seasonal Allergies] Shortness Of Breath and Rash     Perfumes and other aromas, grasses, pine, pollen, mite, mold, diesel, nickel, birch, oak     Pcn [Penicillins] Anaphylaxis     Wasps [Hornets] Anaphylaxis     Food Other (See Comments)     Reactions: vomiting, nausea    Cow's milk, cheddar cheese, cottage cheese, egg whites and yolk, malt, wheat, rye, gluten, barley, honey     Bactrim [Sulfamethoxazole W/Trimethoprim] Rash     Imdur [Isosorbide] Other (See Comments)     No more per Cardio & PharmD     Nickel Rash     Other [No Clinical Screening - See Comments] Other (See Comments)     Reactions: nausea, vomiting    Pineapple, grapefruit, pear, coconut, lashanda, clam, scallop, squash, brewers yeast, zucchini, garlic, mozzarella cheese, yogurt, corn, baker's yeast        Social History:  Social History     Tobacco Use     Smoking status: Never Smoker     Smokeless tobacco: Never Used       ROS: 10 point ROS neg other than the symptoms noted above in the HPI.    Physical Exam:    /69 (BP Location: Left arm, Patient Position: Sitting, Cuff Size: Adult Large)   Pulse 69   Temp 97.1  F (36.2  C) (Temporal)   Ht 1.676 m (5' 6\")   Wt 90.7 kg (200 lb)   SpO2 97%   BMI 32.28 kg/m       Constitutional:  The patient was unaccompanied, well-groomed, and in no acute distress.     Skin: Normal:  warm and pink without rash   Neurologic: Alert and oriented x 3.  CN's III-XII within normal limits.  Voice normal.   Psychiatric: The patient's affect was calm, cooperative, and " appropriate.     Communication:  Normal; communicates verbally, normal voice quality.    Respiratory: Breathing comfortably without stridor or exertion of accessory muscles.    Ears: Pinnae and tragus non-tender.  EAC's and TM's were clear.      Audiogram: 4/28/2022- data independently reviewed  Right ear: Normal sloping to moderate SNHL  Left ear: Normal sloping to moderate SNHL    WR right: 96% @ 85 dB left: 100% @ 75 dB  Tympanograms: type As bilaterally          Assessment and Plan:  1. Bilateral sensorineural hearing loss  Patient with hearing changes since COVID infection in December 2020.  Patient reports right-sided hearing was significantly down until a few weeks ago where she has noticed significant improvement.  Reviewed audiogram with patient today that shows bilateral sensorineural hearing loss with the right sided hearing worse than left in 2 frequencies.  Patient is a candidate for hearing aids bilaterally, however, because hearing has been improving recommend continuing to monitor hearing for another 6 months and repeat audiogram at that time.    Instructed patient to contact clinic immediately if she notices any new changes or fluctuation of hearing.  Will have patient obtain audiogram which I can then review to determine if any intervention is needed.     Patient will follow up in 6 months with audiogram. Sooner for any new changes in hearing.    Juliana Abarca DNP, APRN, CNP  Otolaryngology  Head & Neck Surgery  [phone]    30 minutes spent on the date of the encounter doing chart review, history and exam, documentation and further activities per the note        Again, thank you for allowing me to participate in the care of your patient.      Sincerely,    Margoth Abarca, NP

## 2022-04-28 NOTE — PROGRESS NOTES
Otolaryngology Clinic  April 28, 2022    Chief Complaint:    Right aural fullness and otalgia.       History of Present Illness:   Lashae Rivas is a 62 year old female who presents today for evaluation of right sided ear fullness and pain. Patient reports a history of bilateral tinnitus and hearing loss with right worse than left that occurred 3 months after patient had covid in December 2020. [place] that hearing was down bilaterally which slowly improved. Reports an episode of significant right sided hearing loss about 2 months ago, this has also slowly improved almost back to baseline. Patient still has tinnitus (or hum) on the right side that is intermittent and patient has noticed this noise the past couple of mornings. Reports dizziness with fast changes in position, this lasts only seconds.    Patient denies any otalgia, otorrhea, history of frequent ear infections in adulthood, or ear surgeries. 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 Family history of hearing loss. Sister and mother with hearing aids.     Past Medical History:  Past Medical History:   Diagnosis Date     Adjustment disorder with depressed mood 11/25/2019     Arthropathy of hand 11/25/2019     Asthma 11/25/2019     Gastroesophageal reflux disease without esophagitis 11/25/2019     Hypothyroidism 11/25/2019     Insulin pump status 11/25/2019     Myopia 11/25/2019     Nontoxic uninodular goiter 11/25/2019     Osteoarthrosis, hand 11/25/2019     Prinzmetal angina (HCC) 11/25/2019     Pure hypercholesterolemia 11/25/2019     Sebaceous cyst 11/25/2019     Senile cataract 11/25/2019     Tear film insufficiency 11/25/2019     Type 1 diabetes mellitus (HCC) 11/25/2019     Undiagnosed cardiac murmers 11/25/2019       Past Surgical History:  No past surgical history on file.    Medications:  Current Outpatient Medications   Medication Sig Dispense Refill     acetaminophen (TYLENOL) 650 MG CR tablet         "albuterol (PROVENTIL) (2.5 MG/3ML) 0.083% neb solution NEBZULIZE 1 VIAL EVERY 6 HOURS AS NEEDED FOR SHORTNESS OF BREATH OR WHEEZING  1     aspirin (ASA) 81 MG chewable tablet Take 81 mg by mouth daily       atorvastatin (LIPITOR) 20 MG tablet TAKE 1 TABLET (20 MG TOTAL) BY MOUTH EVERY NIGHT AT BEDTIME  4     B-D ALLERGY SYRINGE 28G X 1/2\" 1 ML MISC USE 3 SYRINGES WEEKLY AS DIRECTED  11     diclofenac (VOLTAREN) 1 % topical gel Apply topically 4 times daily       diltiazem ER COATED BEADS (CARDIZEM CD/CARTIA XT) 180 MG 24 hr capsule TAKE 1 CAPSULE (180 MG) BY MOUTH 1 TIME PER DAY  4     EPINEPHrine (EPIPEN/ADRENACLICK/OR ANY BX GENERIC EQUIV) 0.3 MG/0.3ML injection 2-pack INJECT 1 SYRINGE INTRAMUSCULARLY 1 TIME WHEN NEEDED FOR ALLERGIC REACTION  0     famotidine (PEPCID) 10 MG tablet Take 30 mg by mouth 2 times daily       fexofenadine (ALLEGRA) 180 MG tablet Take 180 mg by mouth daily        Fish Oil-Krill Oil (OMEGA-3 DUAL SPECTRUM PO)        GLUCAGON EMERGENCY 1 MG kit ADMINISTER 1 MG INTRAVENOUSLY AS NEEDED FOR LOW BLOOD SUGAR  1     HUMALOG 100 UNIT/ML injection USE UP TO 70 UNITS DAILY IN INSULIN PUMP  2     LANsoprazole (PREVACID) 30 MG DR capsule TAKE 1 CAPSULE BY MOUTH TWICE A DAY BEFORE A MEAL  11     levothyroxine (SYNTHROID/LEVOTHROID) 25 MCG tablet TAKE 1 TABLET (25 MCG) BY MOUTH 1 TIME PER DAY OMIT ON THE 7TH DAY OF THE WEEK  3     losartan (COZAAR) 25 MG tablet TAKE 0.5 TABLETS BY MOUTH ONCE DAILY.       montelukast (SINGULAIR) 10 MG tablet TAKE 1 TABLET (10 MG) BY MOUTH 1 TIME A DAY IN THE EVENING  4     nitroGLYcerin (NITROSTAT) 0.4 MG sublingual tablet DISSOLVE 1 TABLET UNDER THE TONGUE EVERY 5 MINUTES AS NEEDED FOR CHEST PAIN.  0     pantoprazole (PROTONIX) 40 MG EC tablet Take 40 mg by mouth       ranitidine (ZANTAC) 300 MG tablet TAKE 1 TABLET BY MOUTH EVERYDAY AT BEDTIME  11     simvastatin (ZOCOR) 20 MG tablet Take 20 mg by mouth At Bedtime       UNABLE TO FIND MEDICATION NAME: Weekly allergy " "shots       UNABLE TO FIND 20 mg MEDICATION NAME: simvistatin, 20mg         Allergies:  Allergies   Allergen Reactions     Other [Seasonal Allergies] Shortness Of Breath and Rash     Perfumes and other aromas, grasses, pine, pollen, mite, mold, diesel, nickel, birch, oak     Pcn [Penicillins] Anaphylaxis     Wasps [Hornets] Anaphylaxis     Food Other (See Comments)     Reactions: vomiting, nausea    Cow's milk, cheddar cheese, cottage cheese, egg whites and yolk, malt, wheat, rye, gluten, barley, honey     Bactrim [Sulfamethoxazole W/Trimethoprim] Rash     Imdur [Isosorbide] Other (See Comments)     No more per [facility] Pharm[facility]     Nickel Rash     Other [No Clinical Screening - See Comments] Other (See Comments)     Reactions: nausea, vomiting    Pineapple, grapefruit, pear, coconut, lashanda, clam, scallop, squash, brewers yeast, zucchini, garlic, mozzarella cheese, yogurt, corn, baker's yeast        Social History:  Social History     Tobacco Use     Smoking status: Never Smoker     Smokeless tobacco: Never Used       ROS: 10 point ROS neg other than the symptoms noted above in the HPI.    Physical Exam:    /69 (BP Location: Left arm, Patient Position: Sitting, Cuff Size: Adult Large)   Pulse 69   Temp 97.1  F (36.2  C) (Temporal)   Ht 1.676 m (5' 6\")   Wt 90.7 kg (200 lb)   SpO2 97%   BMI 32.28 kg/m       Constitutional:  The patient was unaccompanied, well-groomed, and in no acute distress.     Skin: Normal:  warm and pink without rash   Neurologic: Alert and oriented x 3.  CN's III-XII within normal limits.  Voice normal.   Psychiatric: The patient's affect was calm, cooperative, and appropriate.     Communication:  Normal; communicates verbally, normal voice quality.    Respiratory: Breathing comfortably without stridor or exertion of accessory muscles.    Ears: Pinnae and tragus non-tender.  EAC's and TM's were clear.      Audiogram: 4/28/2022- data independently reviewed  Right ear: Normal sloping to " moderate SNHL  Left ear: Normal sloping to moderate SNHL    WR right: 96% @ 85 dB left: 100% @ 75 dB  Tympanograms: type As bilaterally          Assessment and Plan:  1. Bilateral sensorineural hearing loss  Patient with hearing changes since COVID infection in December 2020.  Patient reports right-sided hearing was significantly down until a few weeks ago where she has noticed significant improvement.  Reviewed audiogram with patient today that shows bilateral sensorineural hearing loss with the right sided hearing worse than left in 2 frequencies.  Patient is a candidate for hearing aids bilaterally, however, because hearing has been improving recommend continuing to monitor hearing for another 6 months and repeat audiogram at that time.    Instructed patient to contact clinic immediately if she notices any new changes or fluctuation of hearing.  Will have patient obtain audiogram which I can then review to determine if any intervention is needed.     Patient will follow up in 6 months with audiogram. Sooner for any new changes in hearing.    Juliana Abarca DNP, APRN, CNP  Otolaryngology  Head & Neck Surgery  [phone]    30 minutes spent on the date of the encounter doing chart review, history and exam, documentation and further activities per the note

## 2022-04-28 NOTE — NURSING NOTE
"Chief Complaint   Patient presents with     Consult     Right hearing loss and sinus pressure with WIN prior    Blood pressure 128/69, pulse 69, temperature 97.1  F (36.2  C), temperature source Temporal, height 1.676 m (5' 6\"), weight 90.7 kg (200 lb), SpO2 97 %. Rosanna Bentley, EMT  "

## 2022-05-22 ENCOUNTER — HEALTH MAINTENANCE LETTER (OUTPATIENT)
Age: 62
End: 2022-05-22

## 2022-09-18 ENCOUNTER — HEALTH MAINTENANCE LETTER (OUTPATIENT)
Age: 62
End: 2022-09-18

## 2022-10-25 ENCOUNTER — TELEPHONE (OUTPATIENT)
Dept: OTOLARYNGOLOGY | Facility: CLINIC | Age: 62
End: 2022-10-25

## 2022-10-25 ENCOUNTER — OFFICE VISIT (OUTPATIENT)
Dept: OTOLARYNGOLOGY | Facility: CLINIC | Age: 62
End: 2022-10-25
Payer: COMMERCIAL

## 2022-10-25 ENCOUNTER — OFFICE VISIT (OUTPATIENT)
Dept: AUDIOLOGY | Facility: CLINIC | Age: 62
End: 2022-10-25
Payer: COMMERCIAL

## 2022-10-25 VITALS
DIASTOLIC BLOOD PRESSURE: 77 MMHG | WEIGHT: 203 LBS | HEART RATE: 70 BPM | SYSTOLIC BLOOD PRESSURE: 156 MMHG | HEIGHT: 66 IN | BODY MASS INDEX: 32.62 KG/M2 | OXYGEN SATURATION: 97 %

## 2022-10-25 DIAGNOSIS — H90.3 BILATERAL SENSORINEURAL HEARING LOSS: ICD-10-CM

## 2022-10-25 DIAGNOSIS — H90.3 ASYMMETRICAL SENSORINEURAL HEARING LOSS: Primary | ICD-10-CM

## 2022-10-25 DIAGNOSIS — H90.3 SENSORINEURAL HEARING LOSS (SNHL) OF BOTH EARS: Primary | ICD-10-CM

## 2022-10-25 PROCEDURE — 92550 TYMPANOMETRY & REFLEX THRESH: CPT | Mod: 52 | Performed by: AUDIOLOGIST

## 2022-10-25 PROCEDURE — 92557 COMPREHENSIVE HEARING TEST: CPT | Performed by: AUDIOLOGIST

## 2022-10-25 PROCEDURE — 99214 OFFICE O/P EST MOD 30 MIN: CPT | Performed by: REGISTERED NURSE

## 2022-10-25 NOTE — PATIENT INSTRUCTIONS
- You were seen in the ENT Clinic today by Juliana Abarca NP.   - You may proceed with a hearing aid consult.  - Schedule a MRI at your convenience.  - Please send a [employer] [facility] message or call the ENT clinic at [phone] with any questions or concerns.

## 2022-10-25 NOTE — PROGRESS NOTES
AUDIOLOGY REPORT    SUMMARY: Audiology visit completed. See audiogram for results.      RECOMMENDATIONS: Follow-up with ENT.    Francisca Hinkle, [facility]  Licensed Audiologist  [place] License #[id number]

## 2022-10-25 NOTE — NURSING NOTE
"Chief Complaint   Patient presents with     RECHECK     6 month follow up with WIN prior       Blood pressure (!) 156/77, pulse 70, height 1.676 m (5' 6\"), weight 92.1 kg (203 lb), SpO2 97 %.    Holley Laguna, EMT    "

## 2022-10-25 NOTE — PROGRESS NOTES
Otolaryngology Clinic  October 25, 2022    HPI:  Lashae Rivas is here for a recheck of hearing and tinnitus. Last seen in clinic 4/28/22 for evaluation of right aural fullness and otalgia that occurred 3 months after COVID infection in December 2020. Patient reported significant decline in right sided hearing which slowly improved over time. Audiogram in April demonstrated normal sloping to moderately severe sensorineural hearing loss bilaterally with 15 dB asymmetry at 2 and 3 kHz (right worse than left) with symmetric word recognition scores.  Ear exam is normal bilaterally.  Recommended follow-up in 6 months with repeat audiogram.    Today, patient reports she had been doing well until July 2022 when she experienced another COVID infection.  Reported more difficulty with hearing and aural fullness at that time that, again, has slowly improved.  Patient reports ongoing tinnitus when fatigued and itching in ears bilaterally.  Also reports some dizziness with position changes that resolves after a few seconds.    Patient denies any otalgia, otorrhea, or facial numbness/weakness.     Otologic microscope exam:    Patient's ear pathology required use of the binocular microscope for the purpose of cleaning and improving visualization in order to assure a more accurate diagnostic evaluation.    Right ear was examined under the microscope. Normal appearing TM, nicely aerated middle ear space.     Left ear was also examined under the microscope. Normal appearing TM, nicely aerated middle ear space.     Audiogram: 10/25/2022- data independently reviewed  Normal sloping to moderately severe sensorineural hearing loss bilaterally.  Stable from 4/28/2022 audiogram.  Continues to have 10 to 15 dB asymmetry at 2 and 3 kHz with right worse than left.  WR right: 88% at 85 dB left: 100% at 75 dB  Acoustic Reflexes: Could not test right acoustic reflexes due to difficulty with maintaining seal  Tympanograms: type CNT right,  type A left       Assessment and Plan:  1. Asymmetrical sensorineural hearing loss  Patient with stable normal sloping to moderately severe sensorineural hearing loss bilaterally.  Patient continues to have slight asymmetric hearing with right worse than left.  This is stable from previous audiogram.  Discussed obtaining MRI to further evaluate asymmetry which patient would like to proceed with at this time.  Order placed for MRI and will update patient with results once available.    Reviewed audiogram with patient which does demonstrate that she is a hearing aid candidate bilaterally.  Patient is interested in pursuing hearing aids at this time and is medically cleared for these devices.  Patient was provided with a copy of audiogram and letter of medical clearance.    Juliana Abarca DNP, APRN, CNP  Otolaryngology  Head & Neck Surgery  [phone]    30 minutes spent on the date of the encounter doing chart review, history and exam, documentation and further activities per the note

## 2022-10-25 NOTE — LETTER
10/25/2022       RE: Lashae Rivas  [address]  [place] [address]     Dear Colleague,    Thank you for referring your patient, Lashae Rivas, to the [facility] [place] at [facility]. Please see a copy of my visit note below.      Otolaryngology Clinic  October 25, 2022    HPI:  Lashae Rivas is here for a recheck of hearing and tinnitus. Last seen in clinic 4/28/22 for evaluation of right aural fullness and otalgia that occurred 3 months after COVID infection in December 2020. Patient reported significant decline in right sided hearing which slowly improved over time. Audiogram in April demonstrated normal sloping to moderately severe sensorineural hearing loss bilaterally with 15 dB asymmetry at 2 and 3 kHz (right worse than left) with symmetric word recognition scores.  Ear exam is normal bilaterally.  Recommended follow-up in 6 months with repeat audiogram.    Today, patient reports she had been doing well until July 2022 when she experienced another COVID infection.  Reported more difficulty with hearing and aural fullness at that time that, again, has slowly improved.  Patient reports ongoing tinnitus when fatigued and itching in ears bilaterally.  Also reports some dizziness with position changes that resolves after a few seconds.    Patient denies any otalgia, otorrhea, or facial numbness/weakness.     Otologic microscope exam:    Patient's ear pathology required use of the binocular microscope for the purpose of cleaning and improving visualization in order to assure a more accurate diagnostic evaluation.    Right ear was examined under the microscope. Normal appearing TM, nicely aerated middle ear space.     Left ear was also examined under the microscope. Normal appearing TM, nicely aerated middle ear space.     Audiogram: 10/25/2022- data independently reviewed  Normal sloping to moderately severe  sensorineural hearing loss bilaterally.  Stable from 4/28/2022 audiogram.  Continues to have 10 to 15 dB asymmetry at 2 and 3 kHz with right worse than left.  WR right: 88% at 85 dB left: 100% at 75 dB  Acoustic Reflexes: Could not test right acoustic reflexes due to difficulty with maintaining seal  Tympanograms: type CNT right, type A left       Assessment and Plan:  1. Asymmetrical sensorineural hearing loss  Patient with stable normal sloping to moderately severe sensorineural hearing loss bilaterally.  Patient continues to have slight asymmetric hearing with right worse than left.  This is stable from previous audiogram.  Discussed obtaining MRI to further evaluate asymmetry which patient would like to proceed with at this time.  Order placed for MRI and will update patient with results once available.    Reviewed audiogram with patient which does demonstrate that she is a hearing aid candidate bilaterally.  Patient is interested in pursuing hearing aids at this time and is medically cleared for these devices.  Patient was provided with a copy of audiogram and letter of medical clearance.    Juliana Abarca DNP, APRN, CNP  Otolaryngology  Head & Neck Surgery  [phone]    30 minutes spent on the date of the encounter doing chart review, history and exam, documentation and further activities per the note

## 2022-10-25 NOTE — LETTER
Hearing Aid Medical Clearance    Lashae Paris  October 25, 2022        This patient has received a medical examination and may be considered a suitable candidate for a hearing aid.         Provider:__________________________________________________    Juliana Abarca DNP, NILE, CNP.

## 2022-11-07 ENCOUNTER — TELEPHONE (OUTPATIENT)
Dept: OTOLARYNGOLOGY | Facility: CLINIC | Age: 62
End: 2022-11-07

## 2022-11-07 NOTE — TELEPHONE ENCOUNTER
Order was faxed on 10/25. Faxed to [phone] and phone number is [phone]. They will keep an eye for it.    Petra Pryor LPN

## 2022-11-07 NOTE — TELEPHONE ENCOUNTER
[facility] Call Center    Phone Message    May a detailed message be left on voicemail: yes     Reason for Call: Order(s): Other:   Pt called and stated that [facility] has not received the orders for MRI yet. They did not provide her a fax number, but their telephone number is: [phone].  Thank you!      Action Taken: Message routed to:  [facility] ENT    Travel Screening: Not Applicable

## 2023-01-29 ENCOUNTER — HEALTH MAINTENANCE LETTER (OUTPATIENT)
Age: 63
End: 2023-01-29

## 2023-05-07 ENCOUNTER — HEALTH MAINTENANCE LETTER (OUTPATIENT)
Age: 63
End: 2023-05-07

## 2023-10-08 ENCOUNTER — HEALTH MAINTENANCE LETTER (OUTPATIENT)
Age: 63
End: 2023-10-08

## 2023-10-24 ENCOUNTER — TELEPHONE (OUTPATIENT)
Dept: OTOLARYNGOLOGY | Facility: CLINIC | Age: 63
End: 2023-10-24

## 2023-10-24 NOTE — TELEPHONE ENCOUNTER
Called to see if she could do a WIN before her appt today at 2 pm. She would like to cancel as she forgot about the appt today. Will have a  reach out to get scheduled with a WIN.    Petra Pryor LPN

## 2023-10-25 ENCOUNTER — APPOINTMENT (OUTPATIENT)
Dept: URBAN - METROPOLITAN AREA CLINIC 256 | Age: 63
Setting detail: DERMATOLOGY
End: 2023-10-25

## 2023-10-25 DIAGNOSIS — L259 CONTACT DERMATITIS AND OTHER ECZEMA, UNSPECIFIED CAUSE: ICD-10-CM

## 2023-10-25 DIAGNOSIS — L82.0 INFLAMED SEBORRHEIC KERATOSIS: ICD-10-CM

## 2023-10-25 DIAGNOSIS — L73.8 OTHER SPECIFIED FOLLICULAR DISORDERS: ICD-10-CM

## 2023-10-25 PROBLEM — L23.9 ALLERGIC CONTACT DERMATITIS, UNSPECIFIED CAUSE: Status: ACTIVE | Noted: 2023-10-25

## 2023-10-25 PROCEDURE — OTHER LIQUID NITROGEN: OTHER

## 2023-10-25 PROCEDURE — 17110 DESTRUCT B9 LESION 1-14: CPT

## 2023-10-25 PROCEDURE — 99213 OFFICE O/P EST LOW 20 MIN: CPT | Mod: 25

## 2023-10-25 PROCEDURE — OTHER ADDITIONAL NOTES: OTHER

## 2023-10-25 PROCEDURE — OTHER SEPARATE AND IDENTIFIABLE DOCUMENTATION: OTHER

## 2023-10-25 PROCEDURE — OTHER PHOTO-DOCUMENTATION: OTHER

## 2023-10-25 PROCEDURE — OTHER COUNSELING: OTHER

## 2023-10-25 PROCEDURE — OTHER PRESCRIPTION: OTHER

## 2023-10-25 PROCEDURE — OTHER MIPS QUALITY: OTHER

## 2023-10-25 PROCEDURE — OTHER PRESCRIPTION MEDICATION MANAGEMENT: OTHER

## 2023-10-25 RX ORDER — HYDROCORTISONE 25 MG/G
OINTMENT TOPICAL
Qty: 28.35 | Refills: 0 | Status: ERX | COMMUNITY
Start: 2023-10-25

## 2023-10-25 ASSESSMENT — LOCATION ZONE DERM
LOCATION ZONE: LEG
LOCATION ZONE: TRUNK

## 2023-10-25 ASSESSMENT — LOCATION DETAILED DESCRIPTION DERM
LOCATION DETAILED: SUPERIOR THORACIC SPINE
LOCATION DETAILED: RIGHT MEDIAL POPLITEAL SKIN
LOCATION DETAILED: RIGHT POPLITEAL SKIN

## 2023-10-25 ASSESSMENT — LOCATION SIMPLE DESCRIPTION DERM
LOCATION SIMPLE: UPPER BACK
LOCATION SIMPLE: RIGHT POPLITEAL SKIN

## 2023-10-25 NOTE — HPI: RASH
What Type Of Note Output Would You Prefer (Optional)?: Standard Output
How Severe Is Your Rash?: mild
Is This A New Presentation, Or A Follow-Up?: Rash
Additional History: Pt been applying wiping alcohol around the affected areas.

## 2023-10-25 NOTE — PROCEDURE: LIQUID NITROGEN
Show Aperture Variable?: Yes
Medical Necessity Information: It is in your best interest to select a reason for this procedure from the list below. All of these items fulfill various CMS LCD requirements except the new and changing color options.
Number Of Freeze-Thaw Cycles: 2 freeze-thaw cycles
Render Note In Bullet Format When Appropriate: No
Medical Necessity Clause: This procedure was medically necessary because the lesions that were treated were:
Duration Of Freeze Thaw-Cycle (Seconds): 5-10
Post-Care Instructions: I reviewed with the patient in detail post-care instructions. Patient is to wear sunprotection, and avoid picking at any of the treated lesions. Pt may apply Vaseline to crusted or scabbing areas.
Spray Paint Text: The liquid nitrogen was applied to the skin utilizing a spray paint frosting technique.
Detail Level: Detailed
Consent: The patient's consent was obtained including but not limited to risks of crusting, scabbing, blistering, scarring, darker or lighter pigmentary change, recurrence, incomplete removal and infection.

## 2023-10-25 NOTE — PROCEDURE: ADDITIONAL NOTES
Detail Level: Simple
Render Risk Assessment In Note?: no
Additional Notes: Recommended if she wanted it gone, she can get it excised. She defers at this time.
Additional Notes: Rash appears to be caused by a nickel allergy. Symptoms began following dental procedure likely involving nickel.

## 2023-10-25 NOTE — PROCEDURE: PRESCRIPTION MEDICATION MANAGEMENT
Initiate Treatment: Apply hydrocortisone ointment 2.5% ointment to affected rash BID X 1-2 weeks, then QD X 1 week
Detail Level: Detailed
Plan: Follow up in 1 month if not resolved, or sooner if condition worsens.
Render In Strict Bullet Format?: No

## 2024-01-09 ENCOUNTER — MEDICAL CORRESPONDENCE (OUTPATIENT)
Dept: HEALTH INFORMATION MANAGEMENT | Facility: CLINIC | Age: 64
End: 2024-01-09
Payer: COMMERCIAL

## 2024-02-25 ENCOUNTER — HEALTH MAINTENANCE LETTER (OUTPATIENT)
Age: 64
End: 2024-02-25

## 2024-05-05 ENCOUNTER — HEALTH MAINTENANCE LETTER (OUTPATIENT)
Age: 64
End: 2024-05-05

## 2024-07-14 ENCOUNTER — HEALTH MAINTENANCE LETTER (OUTPATIENT)
Age: 64
End: 2024-07-14

## 2024-12-01 ENCOUNTER — HEALTH MAINTENANCE LETTER (OUTPATIENT)
Age: 64
End: 2024-12-01

## 2025-01-12 ENCOUNTER — HEALTH MAINTENANCE LETTER (OUTPATIENT)
Age: 65
End: 2025-01-12

## 2025-03-15 ENCOUNTER — HEALTH MAINTENANCE LETTER (OUTPATIENT)
Age: 65
End: 2025-03-15

## 2025-05-17 ENCOUNTER — HEALTH MAINTENANCE LETTER (OUTPATIENT)
Age: 65
End: 2025-05-17

## 2025-06-28 ENCOUNTER — HEALTH MAINTENANCE LETTER (OUTPATIENT)
Age: 65
End: 2025-06-28